# Patient Record
Sex: MALE | Race: WHITE | NOT HISPANIC OR LATINO | Employment: FULL TIME | URBAN - METROPOLITAN AREA
[De-identification: names, ages, dates, MRNs, and addresses within clinical notes are randomized per-mention and may not be internally consistent; named-entity substitution may affect disease eponyms.]

---

## 2017-03-16 DIAGNOSIS — F41.9 ANXIETY: ICD-10-CM

## 2017-03-16 DIAGNOSIS — G47.00 INSOMNIA, UNSPECIFIED TYPE: ICD-10-CM

## 2017-03-17 RX ORDER — ZOLPIDEM TARTRATE 10 MG/1
10 TABLET ORAL NIGHTLY PRN
Qty: 30 TAB | Refills: 2 | OUTPATIENT
Start: 2017-03-17 | End: 2017-06-15 | Stop reason: SDUPTHER

## 2017-03-17 RX ORDER — ALPRAZOLAM 0.5 MG/1
TABLET ORAL
Qty: 30 TAB | Refills: 2 | OUTPATIENT
Start: 2017-03-17 | End: 2017-06-15 | Stop reason: SDUPTHER

## 2017-06-15 DIAGNOSIS — F41.9 ANXIETY: ICD-10-CM

## 2017-06-15 DIAGNOSIS — G47.00 INSOMNIA, UNSPECIFIED TYPE: ICD-10-CM

## 2017-06-15 RX ORDER — ALPRAZOLAM 0.5 MG/1
TABLET ORAL
Qty: 30 TAB | Refills: 2 | Status: SHIPPED
Start: 2017-06-15 | End: 2017-09-08 | Stop reason: SDUPTHER

## 2017-06-15 RX ORDER — ZOLPIDEM TARTRATE 10 MG/1
10 TABLET ORAL NIGHTLY PRN
Qty: 30 TAB | Refills: 2 | Status: SHIPPED
Start: 2017-06-15 | End: 2017-09-08 | Stop reason: SDUPTHER

## 2017-06-28 ENCOUNTER — OFFICE VISIT (OUTPATIENT)
Dept: OTHER | Facility: MEDICAL CENTER | Age: 52
End: 2017-06-28
Payer: COMMERCIAL

## 2017-06-28 VITALS
BODY MASS INDEX: 30.8 KG/M2 | HEIGHT: 71 IN | OXYGEN SATURATION: 96 % | DIASTOLIC BLOOD PRESSURE: 65 MMHG | HEART RATE: 60 BPM | RESPIRATION RATE: 14 BRPM | SYSTOLIC BLOOD PRESSURE: 110 MMHG | TEMPERATURE: 97.4 F | WEIGHT: 220 LBS

## 2017-06-28 DIAGNOSIS — R93.1 AGATSTON CAC SCORE, <100: ICD-10-CM

## 2017-06-28 DIAGNOSIS — E66.9 OBESITY (BMI 30.0-34.9): ICD-10-CM

## 2017-06-28 DIAGNOSIS — B00.2 RECURRENT ORAL HERPES SIMPLEX: ICD-10-CM

## 2017-06-28 DIAGNOSIS — I25.10 CORONARY ATHEROSCLEROSIS DUE TO CALCIFIED CORONARY LESION OF NATIVE ARTERY: Primary | ICD-10-CM

## 2017-06-28 DIAGNOSIS — I25.84 CORONARY ATHEROSCLEROSIS DUE TO CALCIFIED CORONARY LESION OF NATIVE ARTERY: Primary | ICD-10-CM

## 2017-06-28 DIAGNOSIS — E78.2 MIXED HYPERLIPIDEMIA WITH APOLIPOPROTEIN E3 VARIANT: ICD-10-CM

## 2017-06-28 PROCEDURE — 99214 OFFICE O/P EST MOD 30 MIN: CPT | Performed by: FAMILY MEDICINE

## 2017-06-28 RX ORDER — VALACYCLOVIR HYDROCHLORIDE 1 G/1
2000 TABLET, FILM COATED ORAL 2 TIMES DAILY
Qty: 30 TAB | Refills: 12 | Status: SHIPPED | OUTPATIENT
Start: 2017-06-28 | End: 2021-09-25

## 2017-06-28 NOTE — PATIENT INSTRUCTIONS
Current Outpatient Prescriptions Ordered in [facility]   Medication Sig Dispense Refill   • valacyclovir (VALTREX) 1 GM Tab Take 2 Tabs by mouth 2 times a day. 30 Tab 12   • zolpidem (AMBIEN) 10 MG Tab Take 1 Tab by mouth at bedtime as needed for Sleep. 30 Tab 2   • alprazolam (XANAX) 0.5 MG Tab TAKE ONE TABLET BY MOUTH THREE TIMES DAILY AS NEEDED ANXIETY 30 Tab 2   • simvastatin (ZOCOR) 10 MG Tab TAKE 1 TAB BY MOUTH EVERY DAY. 90 Tab 3   • citalopram (CELEXA) 20 MG Tab Take 1 Tab by mouth every day. 90 Tab 3   • therapeutic multivitamin-minerals (THERAGRAN-M) TABS Take 1 Tab by mouth every day.     • aspirin EC (ECOTRIN) 81 MG TBEC Take 1 Tab by mouth every day. 30 Tab    • disulfiram (ANTABUSE) 250 MG Tab TAKE 1 TABLET BY MOUTH ONCE DAILY 90 Tab 0   • ciprofloxacin (CIPRO) 500 MG Tab Take 1 Tab by mouth 2 times a day. 28 Tab 3   • azithromycin (ZITHROMAX) 250 MG Tab Two by mouth on Day One, then one by mouth daily on days 2-5. 12 Tab 3     No current Epic-ordered facility-administered medications on file.

## 2017-06-28 NOTE — PROGRESS NOTES
SUBJECTIVE:    Chief Complaint   Patient presents with   • Coronary Artery Disease     Subclinical   • Hyperlipidemia   • Obesity       Mc Sosa is a 52 y.o. male,   New Patient to my [facility] (but previously established at my [facility].)    PROBLEM #1-HISTORY OF PRESENT ILLNESS  Existing Problem, but requiring re-evaluation  PATIENT STATEMENT OF PROBLEM - Subclinical CAD (per CTCS), Dyslipidemia, Obesity  ONSET - years  COURSE - Subclinical.  He is attempting to improve Therapeutic Lifestyle Changes.  Due for labs and testing. Counseled.   INTENSITY/STATUS/LOCATION/RADIATION - variable/ present  AGGRAVATORS - inadequate Therapeutic Lifestyle Changes    RELIEVERS - statin, some Therapeutic Lifestyle Changes   TREATMENTS/COMPLIANCE/@GOAL? - same/ inadequate/ no     No Known Allergies    Patient Active Problem List    Diagnosis Date Noted   • Agatston CAC score, <100 06/28/2017   • Obesity (BMI 30.0-34.9) 06/28/2017   • Recurrent oral herpes simplex 06/28/2017   • Right ankle pain 09/06/2016   • DJD (degenerative joint disease), ankle and foot 09/06/2016   • Status post ORIF of fracture of ankle 09/06/2016   • Frequent headaches 06/22/2016   • Dysthymia 06/22/2016   • Coronary atherosclerosis due to calcified coronary lesion of native artery 02/12/2016   • Alcohol abuse, episodic 02/14/2014   • Mixed hyperlipidemia with apolipoprotein E3 variant 12/18/2013   • Disturbance in sleep behavior 12/18/2013   • Stress reaction 10/07/2013   • Insomnia 10/07/2013       Outpatient Encounter Prescriptions as of 6/28/2017   Medication Sig Dispense Refill   • valacyclovir (VALTREX) 1 GM Tab Take 2 Tabs by mouth 2 times a day. 30 Tab 12   • zolpidem (AMBIEN) 10 MG Tab Take 1 Tab by mouth at bedtime as needed for Sleep. 30 Tab 2   • alprazolam (XANAX) 0.5 MG Tab TAKE ONE TABLET BY MOUTH THREE TIMES DAILY AS NEEDED ANXIETY 30 Tab 2   • simvastatin (ZOCOR) 10 MG Tab TAKE 1 TAB BY  "MOUTH EVERY DAY. 90 Tab 3   • citalopram (CELEXA) 20 MG Tab Take 1 Tab by mouth every day. 90 Tab 3   • therapeutic multivitamin-minerals (THERAGRAN-M) TABS Take 1 Tab by mouth every day.     • aspirin EC (ECOTRIN) 81 MG TBEC Take 1 Tab by mouth every day. 30 Tab    • disulfiram (ANTABUSE) 250 MG Tab TAKE 1 TABLET BY MOUTH ONCE DAILY 90 Tab 0   • ciprofloxacin (CIPRO) 500 MG Tab Take 1 Tab by mouth 2 times a day. 28 Tab 3   • azithromycin (ZITHROMAX) 250 MG Tab Two by mouth on Day One, then one by mouth daily on days 2-5. 12 Tab 3   • [DISCONTINUED] omeprazole (PRILOSEC) 20 MG delayed-release capsule Take 1 Cap by mouth every day. 60 Cap 6   • [DISCONTINUED] valacyclovir (VALTREX) 1 GM TABS Take 2 Tabs by mouth 2 times a day. 20 Tab 12     No facility-administered encounter medications on file as of 6/28/2017.       Social History   Substance Use Topics   • Smoking status: Never Smoker    • Smokeless tobacco: Never Used   • Alcohol Use: No      Comment: Previous heavy drinking       No family history on file.    Patient's Past, Social, and Family History reviewed and updated by me in EPIC today.    REVIEW OF SYMPTOMS:               Pertinent Positives as above.    All other systems reviewed and negative.     OBJECTIVE:    /65 mmHg  Pulse 60  Temp(Src) 36.3 °C (97.4 °F)  Resp 14  Ht 1.791 m (5' 10.5\")  Wt 99.791 kg (220 lb)  BMI 31.11 kg/m2  SpO2 96%  Body mass index is 31.11 kg/(m^2).    Well developed, well nourished male, no acute distress, non-ill appearing. Comfortable, appears stated age, pleasant and cooperative  HEAD: atraumatic, normocephalic   EYES: Conjunctiva normal, extra-occular movements intact, PERRLA, acuity grossly intact.   EARS/NOSE/THROAT: TM's normal, no signs or symptoms of infection, no perforation, no hemotympanum, acuity grossly intact. Oropharynx: benign, no lesions noted. Nares: benign.   NECK: supple, no adenopathy, no thyromegaly or nodules, no jugular vein distention, no " carotid bruits.   CHEST/LUNGS: clear to auscultation and percussion bilaterally. No adventitious breath sounds.   CARDIOVASCULAR: regular rate and rhythm, no murmur. Point of maximum intensity not displaced. Good central and peripheral pulses.   BACK: no CVA tenderness.   ABDOMEN: soft, non-tender, non-distended, no masses, no hepatosplenomegaly. Normal active bowel tones.   : deferred.   Rectal: deferred.   Extremities: warm/well-perfused, no cyanosis, clubbing, or edema.   SKIN: clear, unbroken, no rashes, normal turgor.   Neuro: Mental Status: Alert and Oriented x 3. CN II-XII grossly intact. Gait normal. Non-focal, intact. Normal strength, sensation    ASSESSMENT:    1. Coronary atherosclerosis due to calcified coronary lesions of native artery     2. Agatston CAC score, <100     3. Mixed hyperlipidemia with apolipoprotein E3 variant     4. Obesity (BMI 30.0-34.9)     5. Recurrent oral herpes simplex  valacyclovir (VALTREX) 1 GM Tab       PLAN:    Total Face-to-Face time spent with patient: 60 minutes  Amount of time spent counseling patient and/or coordinating care: 40 minutes    The nature of patient counseling as below:  -Patient Education, including below topics:  -Differential Diagnoses and treatment options discussed  -Risks, benefits, alternatives discussed  -Health Maintenance Exam issues discussed  -Exercise  -Dietary recommendations discussed  -Weight Loss strategies discussed  -Therapeutic Lifestyle Changes discussed    The nature of coordination of care as below:  -Medications added: none  -Medications discontinued: none  -Medications adjusted: Valtrex refilled  -Continue (other) present chronic medications  -Labs: soon, fasting: Platinum Initial...cmp/cbc/ua/uacr  -Referrals: none  -Other: I recommended he obtain a FLU vaccine in 2017 (he missed this in 2016)   Testing for DAY OF annual Executive H&P   -TVS w/ cIMT   -Dx Chest   -Abdominal U/S   -ETT  -Seek medical attention immediately if  worse    FOLLOW-UP:  -in 1-2 months for annual Executive H&P  -and sooner if test/consult results warrant  And for Health Care Maintenance Exams  And as needed.

## 2017-07-05 ENCOUNTER — APPOINTMENT (OUTPATIENT)
Dept: OTHER | Facility: MEDICAL CENTER | Age: 52
End: 2017-07-05

## 2017-07-05 ENCOUNTER — HOSPITAL ENCOUNTER (OUTPATIENT)
Facility: MEDICAL CENTER | Age: 52
End: 2017-07-05
Attending: FAMILY MEDICINE
Payer: COMMERCIAL

## 2017-07-05 DIAGNOSIS — Z00.00 WELL ADULT EXAM: ICD-10-CM

## 2017-07-05 LAB
ALBUMIN SERPL BCP-MCNC: 4 G/DL (ref 3.2–4.9)
ALBUMIN/GLOB SERPL: 1.3 G/DL
ALP SERPL-CCNC: 51 U/L (ref 30–99)
ALT SERPL-CCNC: 26 U/L (ref 2–50)
ANION GAP SERPL CALC-SCNC: 7 MMOL/L (ref 0–11.9)
APPEARANCE UR: CLEAR
AST SERPL-CCNC: 23 U/L (ref 12–45)
BASOPHILS # BLD AUTO: 0.8 % (ref 0–1.8)
BASOPHILS # BLD: 0.04 K/UL (ref 0–0.12)
BILIRUB SERPL-MCNC: 1 MG/DL (ref 0.1–1.5)
BILIRUB UR QL STRIP.AUTO: NEGATIVE
BUN SERPL-MCNC: 18 MG/DL (ref 8–22)
CALCIUM SERPL-MCNC: 9.4 MG/DL (ref 8.5–10.5)
CHLORIDE SERPL-SCNC: 103 MMOL/L (ref 96–112)
CO2 SERPL-SCNC: 25 MMOL/L (ref 20–33)
COLOR UR: YELLOW
CREAT SERPL-MCNC: 0.94 MG/DL (ref 0.5–1.4)
EOSINOPHIL # BLD AUTO: 0.28 K/UL (ref 0–0.51)
EOSINOPHIL NFR BLD: 5.5 % (ref 0–6.9)
ERYTHROCYTE [DISTWIDTH] IN BLOOD BY AUTOMATED COUNT: 45.8 FL (ref 35.9–50)
GFR SERPL CREATININE-BSD FRML MDRD: >60 ML/MIN/1.73 M 2
GLOBULIN SER CALC-MCNC: 3.2 G/DL (ref 1.9–3.5)
GLUCOSE SERPL-MCNC: 82 MG/DL (ref 65–99)
GLUCOSE UR STRIP.AUTO-MCNC: NEGATIVE MG/DL
HCT VFR BLD AUTO: 48.6 % (ref 42–52)
HGB BLD-MCNC: 16.8 G/DL (ref 14–18)
IMM GRANULOCYTES # BLD AUTO: 0.02 K/UL (ref 0–0.11)
IMM GRANULOCYTES NFR BLD AUTO: 0.4 % (ref 0–0.9)
KETONES UR STRIP.AUTO-MCNC: ABNORMAL MG/DL
LEUKOCYTE ESTERASE UR QL STRIP.AUTO: NEGATIVE
LYMPHOCYTES # BLD AUTO: 1.89 K/UL (ref 1–4.8)
LYMPHOCYTES NFR BLD: 37 % (ref 22–41)
MCH RBC QN AUTO: 32.9 PG (ref 27–33)
MCHC RBC AUTO-ENTMCNC: 34.6 G/DL (ref 33.7–35.3)
MCV RBC AUTO: 95.1 FL (ref 81.4–97.8)
MICRO URNS: ABNORMAL
MONOCYTES # BLD AUTO: 0.5 K/UL (ref 0–0.85)
MONOCYTES NFR BLD AUTO: 9.8 % (ref 0–13.4)
NEUTROPHILS # BLD AUTO: 2.38 K/UL (ref 1.82–7.42)
NEUTROPHILS NFR BLD: 46.5 % (ref 44–72)
NITRITE UR QL STRIP.AUTO: NEGATIVE
NRBC # BLD AUTO: 0 K/UL
NRBC BLD AUTO-RTO: 0 /100 WBC
PH UR STRIP.AUTO: 6.5 [PH]
PLATELET # BLD AUTO: 195 K/UL (ref 164–446)
PMV BLD AUTO: 10.3 FL (ref 9–12.9)
POTASSIUM SERPL-SCNC: 4.3 MMOL/L (ref 3.6–5.5)
PROT SERPL-MCNC: 7.2 G/DL (ref 6–8.2)
PROT UR QL STRIP: NEGATIVE MG/DL
RBC # BLD AUTO: 5.11 M/UL (ref 4.7–6.1)
RBC UR QL AUTO: NEGATIVE
SODIUM SERPL-SCNC: 135 MMOL/L (ref 135–145)
SP GR UR STRIP.AUTO: 1.01
WBC # BLD AUTO: 5.1 K/UL (ref 4.8–10.8)

## 2017-07-06 LAB
CREAT UR-MCNC: 76.2 MG/DL
MICROALBUMIN UR-MCNC: <0.7 MG/DL
MICROALBUMIN/CREAT UR: NORMAL MG/G (ref 0–30)

## 2017-07-07 DIAGNOSIS — Z00.00 WELL ADULT EXAM: ICD-10-CM

## 2017-08-08 ENCOUNTER — HOSPITAL ENCOUNTER (OUTPATIENT)
Dept: RADIOLOGY | Facility: MEDICAL CENTER | Age: 52
End: 2017-08-08
Attending: FAMILY MEDICINE

## 2017-08-08 ENCOUNTER — OFFICE VISIT (OUTPATIENT)
Dept: OTHER | Facility: MEDICAL CENTER | Age: 52
End: 2017-08-08

## 2017-08-08 VITALS
HEART RATE: 55 BPM | OXYGEN SATURATION: 98 % | HEIGHT: 70 IN | DIASTOLIC BLOOD PRESSURE: 84 MMHG | BODY MASS INDEX: 29.35 KG/M2 | SYSTOLIC BLOOD PRESSURE: 116 MMHG | RESPIRATION RATE: 14 BRPM | WEIGHT: 205 LBS | TEMPERATURE: 98.5 F

## 2017-08-08 DIAGNOSIS — I25.10 CORONARY ATHEROSCLEROSIS DUE TO CALCIFIED CORONARY LESION OF NATIVE ARTERY: ICD-10-CM

## 2017-08-08 DIAGNOSIS — Z00.00 WELL ADULT EXAM: ICD-10-CM

## 2017-08-08 DIAGNOSIS — I25.84 CORONARY ATHEROSCLEROSIS DUE TO CALCIFIED CORONARY LESION OF NATIVE ARTERY: ICD-10-CM

## 2017-08-08 DIAGNOSIS — E66.3 OVERWEIGHT (BMI 25.0-29.9): ICD-10-CM

## 2017-08-08 DIAGNOSIS — E78.2 MIXED HYPERLIPIDEMIA WITH APOLIPOPROTEIN E3 VARIANT: ICD-10-CM

## 2017-08-08 DIAGNOSIS — R93.1 AGATSTON CAC SCORE, <100: ICD-10-CM

## 2017-08-08 DIAGNOSIS — I65.23 CAROTID ATHEROSCLEROSIS, BILATERAL: ICD-10-CM

## 2017-08-08 DIAGNOSIS — Z00.00 WELL ADULT EXAM: Primary | ICD-10-CM

## 2017-08-08 PROCEDURE — 306734 HCHG ADVANCED VASCULAR SCREENING

## 2017-08-08 PROCEDURE — 71020 DX-CHEST-2 VIEWS: CPT

## 2017-08-08 PROCEDURE — 76700 US EXAM ABDOM COMPLETE: CPT

## 2017-08-08 RX ORDER — ROSUVASTATIN CALCIUM 10 MG/1
10 TABLET, COATED ORAL EVERY EVENING
Qty: 90 TAB | Refills: 4 | Status: SHIPPED
Start: 2017-08-08 | End: 2017-12-05 | Stop reason: SINTOL

## 2017-08-08 RX ORDER — ROSUVASTATIN CALCIUM 10 MG/1
10 TABLET, COATED ORAL EVERY EVENING
Qty: 100 TAB | Refills: 12 | Status: SHIPPED
Start: 2017-08-08 | End: 2017-08-08 | Stop reason: SDUPTHER

## 2017-08-08 NOTE — LETTER
"August 28, 2017        Mc Ian  [address]  [place] [address]        Dear Mc:    Thank you for participating in [facility].  I enjoyed meeting with you again today and performing your Executive History and Physical examination.  We covered a great deal of information, and I have summarized my Assessment and Plan below.  Please carefully review all the information in this packet.  I do suggest you schedule an appointment with me again in about 4 months.    Overall, I consider you to be in very good health.  I am particularly pleased with your new found commitment to exercise, and striving for a healthy weight.  We did, however, discuss and identify some new and existing health issues that require further attention.  Specific to your future Cardiovascular Disease (CVD) risk, we reviewed the following: overweight status, known subclinical mild coronary artery atherosclerosis, newly discovered mild bilateral carotid atherosclerosis, and dyslipidemia.  The best treatment for these maladies is Therapeutic Lifestyle Changes.  Specifically, I recommend eating \"real food, mostly plants, not too much,\" striving for a BMI of 25, daily exercise, active stress management, 7-8 hours of quality sleep per night, a loving social environment, and an altruistic philosophy.  Two excellent resources are the books \"Blue Zones\" by Juni Martinez and \"Spectrum\" by Dr. Jairon Perez.  In addition, I do recommend initiating the medication Rosuvastatin daily, as we discussed.  Please plan on obtaining a FLU vaccine this Fall.  If you have any questions or concerns, please don't hesitate to call.        Sincerely,        Aris Oseguera M.D.              ASSESSMENT:    Encounter Diagnoses   Name Primary?   • Executive History and Physical Examination Yes   • Overweight (BMI 25.0-29.9)    • Agatston CAC score, <100    • Mixed hyperlipidemia with apolipoprotein E3 variant    • Coronary atherosclerosis due to " "calcified coronary lesion of native artery, per CTCS: 19.6 in 2015    • Carotid atherosclerosis, bilateral        PLAN:    Total Face-to-Face time spent with patient: 75 minutes  Amount of time spent counseling patient and/or coordinating care: 50 minutes    The nature of patient counseling as below:  -Patient Education  -Differential Diagnoses and treatment options discussed  -Risks, benefits, alternatives discussed  -Labs reviewed with patient in detail  -Imaging/ETT/PFT/vision/hearing reports reviewed with patient in detail  -Health Maintenance Exam issues discussed  -Exercise  -Dietary recommendations discussed  -Weight Loss strategies discussed  -Including bibliotherapy in form of \"Spectrum\" by Dr. Jairon Perez   -Including bibliotherapy in the form of \"Blue Zones\" by Juni Anderson  -Therapeutic Lifestyle Changes discussed    The nature of coordination of care as below:  -Medications added: Begin Rosuvastatin 10 mg/day  -Medications discontinued: none  -Medications adjusted: none  -Continue present chronic medications  -Referrals: none  -Other: annual FLU vaccine this Fall  -Seek medical attention immediately if worse    FOLLOW-UP:  -With Primary Care Provider in 4 months     "

## 2017-08-08 NOTE — PROGRESS NOTES
[facility]    EXECUTIVE HISTORY AND PHYSICAL  Performed by Dr. Aris Oseguera    SUBJECTIVE:    Chief Complaint   Patient presents with   • Executive Physical       Mc Sosa is a 52 y.o. male,   Established Patient @ [facility]    Preventive medicine issues discussed:  abuse, aspirin, dental, Alcohol, Tobacco, HIV/AIDS, injuries, mental health/depression, nutrition, exercise, occupational health, sexual behavior, UV exposure, Cancer Screening. Vaccines.      PROBLEM #1-HISTORY OF PRESENT ILLNESS  New Problem  Existing Problem  PATIENT STATEMENT OF PROBLEM - ***  ONSET - ***  COURSE - ***  INTENSITY/STATUS/LOCATION/RADIATION - ***  AGGRAVATORS - ***  RELIEVERS - ***  TREATMENTS/COMPLIANCE/@GOAL? - ***    PROBLEM #2-HISTORY OF PRESENT ILLNESS  New Problem  Existing Problem  PATIENT STATEMENT OF PROBLEM - ***  ONSET - ***  COURSE - ***  INTENSITY/STATUS/LOCATION/RADIATION - ***  AGGRAVATORS - ***  RELIEVERS - ***  TREATMENTS/COMPLIANCE/@GOAL? - ***    PROBLEM #3-HISTORY OF PRESENT ILLNESS  New Problem  Existing Problem  PATIENT STATEMENT OF PROBLEM - ***  ONSET - ***  COURSE - ***  INTENSITY/STATUS/LOCATION/RADIATION - ***  AGGRAVATORS - ***  RELIEVERS - ***  TREATMENTS/COMPLIANCE/@GOAL? - ***    PROBLEM #4-HISTORY OF PRESENT ILLNESS  New Problem  Existing Problem  PATIENT STATEMENT OF PROBLEM - ***  ONSET - ***  COURSE - ***  INTENSITY/STATUS/LOCATION/RADIATION - ***  AGGRAVATORS - ***  RELIEVERS - ***  TREATMENTS/COMPLIANCE/@GOAL? - ***    PROBLEM #5-HISTORY OF PRESENT ILLNESS  New Problem  Existing Problem  PATIENT STATEMENT OF PROBLEM - ***  ONSET - ***  COURSE - ***  INTENSITY/STATUS/LOCATION/RADIATION - ***  AGGRAVATORS - ***  RELIEVERS - ***  TREATMENTS/COMPLIANCE/@GOAL? - ***    PROBLEM #6-HISTORY OF PRESENT ILLNESS  New Problem  Existing Problem  PATIENT STATEMENT OF PROBLEM - ***  ONSET - ***  COURSE - ***  INTENSITY/STATUS/LOCATION/RADIATION - ***  AGGRAVATORS -  ***  RELIEVERS - ***  TREATMENTS/COMPLIANCE/@GOAL? - ***    PROBLEM #7-HISTORY OF PRESENT ILLNESS  New Problem  Existing Problem  PATIENT STATEMENT OF PROBLEM - ***  ONSET - ***  COURSE - ***  INTENSITY/STATUS/LOCATION/RADIATION - ***  AGGRAVATORS - ***  RELIEVERS - ***  TREATMENTS/COMPLIANCE/@GOAL? - ***    No Known Allergies    Patient Active Problem List    Diagnosis Date Noted   • Agatston CAC score, <100 06/28/2017   • Obesity (BMI 30.0-34.9) 06/28/2017   • Recurrent oral herpes simplex 06/28/2017   • Right ankle pain 09/06/2016   • DJD (degenerative joint disease), ankle and foot 09/06/2016   • Status post ORIF of fracture of ankle 09/06/2016   • Frequent headaches 06/22/2016   • Dysthymia 06/22/2016   • Coronary atherosclerosis due to calcified coronary lesion of native artery 02/12/2016   • Alcohol abuse, episodic 02/14/2014   • Mixed hyperlipidemia with apolipoprotein E3 variant 12/18/2013   • Disturbance in sleep behavior 12/18/2013   • Stress reaction 10/07/2013   • Insomnia 10/07/2013       Current Outpatient Prescriptions on File Prior to Visit   Medication Sig Dispense Refill   • valacyclovir (VALTREX) 1 GM Tab Take 2 Tabs by mouth 2 times a day. 30 Tab 12   • zolpidem (AMBIEN) 10 MG Tab Take 1 Tab by mouth at bedtime as needed for Sleep. 30 Tab 2   • alprazolam (XANAX) 0.5 MG Tab TAKE ONE TABLET BY MOUTH THREE TIMES DAILY AS NEEDED ANXIETY 30 Tab 2   • simvastatin (ZOCOR) 10 MG Tab TAKE 1 TAB BY MOUTH EVERY DAY. 90 Tab 3   • disulfiram (ANTABUSE) 250 MG Tab TAKE 1 TABLET BY MOUTH ONCE DAILY 90 Tab 0   • ciprofloxacin (CIPRO) 500 MG Tab Take 1 Tab by mouth 2 times a day. 28 Tab 3   • azithromycin (ZITHROMAX) 250 MG Tab Two by mouth on Day One, then one by mouth daily on days 2-5. 12 Tab 3   • citalopram (CELEXA) 20 MG Tab Take 1 Tab by mouth every day. 90 Tab 3   • therapeutic multivitamin-minerals (THERAGRAN-M) TABS Take 1 Tab by mouth every day.     • aspirin EC (ECOTRIN) 81 MG TBEC Take 1 Tab by  "mouth every day. 30 Tab      No current facility-administered medications on file prior to visit.       Social History     Social History   • Marital Status:      Spouse Name: N/A   • Number of Children: N/A   • Years of Education: N/A     Occupational History   • Not on file.     Social History Main Topics   • Smoking status: Never Smoker    • Smokeless tobacco: Never Used   • Alcohol Use: No      Comment: Previous heavy drinking   • Drug Use: No   • Sexual Activity:     Partners: Female     Other Topics Concern   • Not on file     Social History Narrative       No family history on file.    Patient's Past, Social, and Family History reviewed     REVIEW OF SYMPTOMS:    GEN: Denies weight changes, appetite changes, unusual weakness, bleeding, fever, chills, recent trauma or infections.    HEENT: Denies vision changes, hearing changes, epistaxis, unusual sneezing, sore throat, swallowing difficulties, ear pain, or facial pain.    NECK: Denies neck pain, swellings, or stiffness.    LUNGS: Denies cough, dyspnea, orthopnea, or hemoptysis.    HEART: Denies palpitations or chest pain.    ABD: Denies abdomen pain, eructation, nausea, vomiting, hematemesis, diarrhea, constipation, hematochezia, melena, acholic stools, or flatulence.    GENT: Denies recent dysuria, urine frequency, urine hesitancy, urine urgency, urine flow-slow, urine retention, nocturia, polyuria, dark urine, or incontinence.    BONES/JOINTS/EXTREMITIES: Denies arthralgia, joint stiffness, back pain, muscle cramps, or myalgia.    SKIN: Denies rashes, lesions, anhidrosis, bruising, pruritus.    NEURO: Denies memory loss, disorientation, syncope, diplopia, dizziness, vertigo, clumsiness, paresthesias, or cephalgia.    OBJECTIVE:    /84 mmHg  Pulse 55  Temp(Src) 36.9 °C (98.5 °F)  Resp 14  Ht 1.778 m (5' 10\")  Wt 92.987 kg (205 lb)  BMI 29.41 kg/m2  SpO2 98%  Body mass index is 29.41 kg/(m^2).    Well developed, well nourished male, no " acute distress, non-ill appearing. Comfortable, appears stated age, pleasant and cooperative, Alert and Oriented x 3.   HEAD: atraumatic, normocephalic   EYES: Conjunctiva normal, EOMI, PERRLA, acuity grossly intact.   EARS/NOSE/THROAT: TM's normal, no SSX of infection, no perforation, no hemotympanum, acuity grossly intact. Oropharynx: benign, no lesions noted. Nares: benign.   NECK: supple, no adenopathy, no thyromegaly or nodules, no JVD, no carotid bruits.   CHEST/LUNGS: clear to auscultation and percussion bilaterally. No adventitious breath sounds.   CARDIOVASCULAR: regular rate and rhythm, no murmur. PMI not displaced. Good central and peripheral pulses.   BACK: no CVA tenderness.   ABDOMEN: soft, non-tender, non-distended, no masses, no hepatosplenomegaly. Normal active bowel tones.   : deferred.   Rectal: deferred.   Extremities: warm/well-perfused, no cyanosis, clubbing, or edema.   SKIN: clear, unbroken, no rashes, normal turgor.   Neuro: Mental Status: Alert and Oriented x 3. CN II-XII grossly intact. Gait normal. Non-focal, intact. Normal strength, sensation    ASSESSMENT:    No diagnosis found.    PLAN:    Total Face-to-Face time spent with patient: *** minutes  Amount of time spent counseling patient and/or coordinating care: *** minutes    The nature of patient counseling as below:  -Patient Education  -Differential Diagnoses and treatment options discussed  -Risks, benefits, alternatives discussed  -Labs reviewed with patient in detail  -*** reviewed with patient in detail  -Health Maintenance Exam issues discussed  -Exercise  -Dietary recommendations discussed  -Weight Loss strategies discussed  -QUIT SMOKING!!!  -Therapeutic Lifestyle Changes discussed    The nature of coordination of care as below:  -Medications added: ***  -Medications discontinued: ***  -Medications adjusted: ***  -Continue present chronic medications  -Referrals: ***  -Other: ***  -Seek medical attention immediately if  worse    FOLLOW-UP:  -With Primary Care Provider in ***

## 2017-08-08 NOTE — LETTER
"August 28, 2017        Mc Ian  [address]  [place] [address]        Dear Mc:    Thank you for participating in [facility].  I enjoyed meeting with you again today and performing your Executive History and Physical examination.  We covered a great deal of information, and I have summarized my Assessment and Plan below.  Please carefully review all the information in this packet.  I do suggest you schedule an appointment with me in about 4 months.    Overall, I consider you to be in good health.  We did, however, identify and/or discuss several issues that require further attention.  In terms of future Cardiovascular Disease (CVD) risk, we discussed the following: known mild Coronary Artery atherosclerosis via CT-Cardiac Score in 2015, mild bilateral Carotid Artery atherosclerosis identified today via Ultrasound, overweight status, dyslipidemia.  The best treatment for these issues is Therapeutic Lifestyle Changes. Specifically, I recommend eating \"real food, mostly plants, not too much,\" daily exercise, active stress management, 7-8 hours of quality sleep per night, and an altruistic philosophy.  Two excellent resources are the books \"Blue Zones\" by Juni Martinez, and \"Spectrum\" by Dr. Jairon Perez.  In addition, I do recommend the addition of Rosuvastatin 10 mg/day to help improve your dyslipidemia, and potentially       If you have any questions or concerns, please don't hesitate to call.        Sincerely,        Aris Oseguera M.D.    ASSESSMENT:    Encounter Diagnoses   Name Primary?   • Executive History and Physical Examination Yes   • Overweight (BMI 25.0-29.9)    • Agatston CAC score, <100    • Mixed hyperlipidemia with apolipoprotein E3 variant    • Coronary atherosclerosis due to calcified coronary lesion of native artery, per CTCS: 19.6 in 2015    • Carotid atherosclerosis, bilateral        PLAN:    Total Face-to-Face time spent with patient: 75 minutes  Amount of " "time spent counseling patient and/or coordinating care: 50 minutes    The nature of patient counseling as below:  -Patient Education  -Differential Diagnoses and treatment options discussed  -Risks, benefits, alternatives discussed  -Labs reviewed with patient in detail  -Imaging/ETT/PFT/vision/hearing reports reviewed with patient in detail  -Health Maintenance Exam issues discussed  -Exercise  -Dietary recommendations discussed  -Weight Loss strategies discussed  -Including bibliotherapy in form of \"Spectrum\" by Dr. Jairon Perez   -Including bibliotherapy in the form of \"Blue Zones\" by Juni Anderson  -Therapeutic Lifestyle Changes discussed    The nature of coordination of care as below:  -Medications added: Begin Rosuvastatin 10 mg/day  -Medications discontinued: none  -Medications adjusted: none  -Continue present chronic medications  -Referrals: none  -Other: annual FLU vaccine this Fall  -Seek medical attention immediately if worse    FOLLOW-UP:  -With Primary Care Provider in 4 months       "

## 2017-08-16 DIAGNOSIS — S46.812A PARTIAL TEAR OF LEFT SUBSCAPULARIS TENDON, INITIAL ENCOUNTER: ICD-10-CM

## 2017-08-16 DIAGNOSIS — M75.42 IMPINGEMENT SYNDROME OF LEFT SHOULDER REGION: ICD-10-CM

## 2017-08-16 DIAGNOSIS — M75.22 BICEPS TENDINITIS OF LEFT SHOULDER: Primary | ICD-10-CM

## 2017-08-16 PROBLEM — M75.102 TEAR OF LEFT SUPRASPINATUS TENDON: Status: ACTIVE | Noted: 2017-08-16

## 2017-08-28 PROBLEM — I65.29 CAROTID ATHEROSCLEROSIS: Status: ACTIVE | Noted: 2017-08-28

## 2017-08-28 PROBLEM — E66.9 OBESITY (BMI 30.0-34.9): Status: RESOLVED | Noted: 2017-06-28 | Resolved: 2017-08-28

## 2017-08-28 PROBLEM — Z00.00 WELL ADULT EXAM: Status: ACTIVE | Noted: 2017-08-28

## 2017-08-28 PROBLEM — E66.3 OVERWEIGHT (BMI 25.0-29.9): Status: ACTIVE | Noted: 2017-08-28

## 2017-08-28 NOTE — PROGRESS NOTES
[facility]    EXECUTIVE HISTORY AND PHYSICAL  Performed by Dr. Aris Oseguera    SUBJECTIVE:    Chief Complaint   Patient presents with   • Executive Physical   • Other     Overweight, BMI: 29   • Coronary Artery Disease     Subclinical, per CTCS 19.6 in 2015   • Other     Bilateral Carotid Atherosclerosis per Ultrasound   • Hyperlipidemia       Mc Sosa is a 52 y.o. male,   Established Patient @ [facility]    Preventive medicine issues discussed:  abuse, aspirin, dental, Alcohol, Tobacco, HIV/AIDS, injuries, mental health/depression, nutrition, exercise, occupational health, sexual behavior, UV exposure, Cancer Screening. Vaccines.      PROBLEM #1-HISTORY OF PRESENT ILLNESS  PATIENT STATEMENT OF PROBLEM - Cardiovascular Disease Risk related issues  ONSET - discussed and/or identified today  COURSE - Asymptomatic. No CVD/CeVD event history.  Known mild CAD per CTCS of 19.6 in 2015.  Today's imaging reveals mild bilateral Carotid Atherosclerosis, and a Vascular Age consistent with his Chronological Age.  Current pertinent labs:   HIGH & INT RISK: Tchol/LDL/TRIG/Non-HDL/ApoB/LDL-P/sdLDL/ApoB:ApoA-1/FIB/Lp-PLA2/Adiponectin/Fatty Acid Balance.   Counseled.   INTENSITY/STATUS/LOCATION/RADIATION - variable/ present, asymptomatic/ as above  AGGRAVATORS - Multifactorial including inadequate Therapeutic Lifestyle Changes, genetics  RELIEVERS - some Therapeutic Lifestyle Changes including recent intentional weight loss and exercise  TREATMENTS/COMPLIANCE/@GOAL? - same/ improving Therapeutic Lifestyle Changes/ clinically yes.     Diagnosing METABOLIC SYNDROME  -?-Must have 3 or more of the following 5 Risk Factors(Patient meets criteria # 2)     RISK FACTOR    DEFINING LEVEL  1-Abdominal Obesity        Waist circumference@[email or web address]   Men (North Americans)  >102 cm (>40 inches)   Women (North Americans)  >88 cm (>35 inches)  (see literature for Ethnic Group waist circumference  differences)  2-Triglycerides ?150 mg/dL (or on treatment for this lipid disorder)  3-HDL Cholesterol    Men  <40 mg/dL (or on treatment for this lipid disorder)   Women <50 mg/dL (or on treatment for this lipid disorder)  4-Blood Pressure  ?130 systolic or ?84 diastolic (or on HTN treatment)  5-Fasting Glucose ?100 mg/dL(or previously diagnosed DM or Ins. Resistance)  (FYI: If FBS ?100 mg/dL, then patient also has Insulin Resistance)    Synonyms  Hypertension-hyperglycemia-hyperuricemia syndrome   Syndrome X   Dysmetabolic syndrome X   Insulin resistance syndrome   Metabolic dyslipidemia   The deadly quartet (upper-body obesity, glucose intolerance, hypertriglyceridemia, and hypertension)   Civilization syndrome  Reaven Syndrome    Diagnosing INSULIN RESISTANCE: Any 1 of following (Patient meets criteria: none)  1. Presence of METABOLIC SYNDROME  2. TRIGLYCERIDE/HDL RATIO:  - >3.5 = IR in Caucasians  - ?3.0 = IR in /[place] Americans  - ?2.0 = IR in Non- Blacks  3. Fasting Blood Sugar ? 100 mg/dL         (If FBS > 126, then DM2)  4. Oral Glucose Tolerance Test   - One hour glucose: ? 125 mg/dL   - (If > 150, significantly increased risk of developing DM2)  - Two hour glucose: ? 120 mg/dL  - (120-139=only 33% B-cell fx. 140-199=only 15% B-cell fx)   - (200 or above=DM2 and only 10% B-cell fx.)  - PRE-DIABETES, a type of Insulin Resistance:  o Two hour glucose of 140 to 199  5. A1c ? 6.5%                     (or ? 5.7 % AND the following 2 Dental Parameters:    1- ? 26% of Gum Pockets are ?5mm depth    2- ? 4 Missing Teeth)      No Known Allergies    Patient Active Problem List    Diagnosis Date Noted   • Executive History and Physical Examination 08/28/2017   • Overweight (BMI 25.0-29.9) 08/28/2017   • Carotid atherosclerosis 08/28/2017   • Biceps tendinitis of left shoulder 08/16/2017   • Impingement syndrome of left shoulder region 08/16/2017   • Near complete tear of left supraspinatus tendon  08/16/2017   • Partial tear of left subscapularis tendon 08/16/2017   • Agatston CAC score, <100 06/28/2017   • Recurrent oral herpes simplex 06/28/2017   • Right ankle pain 09/06/2016   • DJD (degenerative joint disease), ankle and foot 09/06/2016   • Status post ORIF of fracture of ankle 09/06/2016   • Frequent headaches 06/22/2016   • Dysthymia 06/22/2016   • Coronary atherosclerosis due to calcified coronary lesion of native artery 02/12/2016   • Alcohol abuse, episodic 02/14/2014   • Mixed hyperlipidemia with apolipoprotein E3 variant 12/18/2013   • Disturbance in sleep behavior 12/18/2013   • Stress reaction 10/07/2013   • Insomnia 10/07/2013       Current Outpatient Prescriptions on File Prior to Visit   Medication Sig Dispense Refill   • valacyclovir (VALTREX) 1 GM Tab Take 2 Tabs by mouth 2 times a day. 30 Tab 12   • zolpidem (AMBIEN) 10 MG Tab Take 1 Tab by mouth at bedtime as needed for Sleep. 30 Tab 2   • alprazolam (XANAX) 0.5 MG Tab TAKE ONE TABLET BY MOUTH THREE TIMES DAILY AS NEEDED ANXIETY 30 Tab 2   • disulfiram (ANTABUSE) 250 MG Tab TAKE 1 TABLET BY MOUTH ONCE DAILY 90 Tab 0   • therapeutic multivitamin-minerals (THERAGRAN-M) TABS Take 1 Tab by mouth every day.     • aspirin EC (ECOTRIN) 81 MG TBEC Take 1 Tab by mouth every day. 30 Tab    • ciprofloxacin (CIPRO) 500 MG Tab Take 1 Tab by mouth 2 times a day. 28 Tab 3   • azithromycin (ZITHROMAX) 250 MG Tab Two by mouth on Day One, then one by mouth daily on days 2-5. 12 Tab 3     No current facility-administered medications on file prior to visit.        Social History     Social History   • Marital status:      Spouse name: N/A   • Number of children: N/A   • Years of education: N/A     Occupational History   • Not on file.     Social History Main Topics   • Smoking status: Never Smoker   • Smokeless tobacco: Never Used   • Alcohol use No      Comment: Previous heavy drinking   • Drug use: No   • Sexual activity: Yes     Partners: Female  "    Other Topics Concern   • Not on file     Social History Narrative   • No narrative on file       No family history on file.    Patient's Past, Social, and Family History reviewed     REVIEW OF SYMPTOMS:               Pertinent Positives as above.    All other systems reviewed and negative.    OBJECTIVE:    /84   Pulse (!) 55   Temp 36.9 °C (98.5 °F)   Resp 14   Ht 1.778 m (5' 10\")   Wt 93 kg (205 lb)   SpO2 98%   BMI 29.41 kg/m²   Body mass index is 29.41 kg/m².    Well developed, well nourished male, no acute distress, non-ill appearing. Comfortable, appears stated age, pleasant and cooperative, Alert and Oriented x 3.   HEAD: atraumatic, normocephalic   EYES: Conjunctiva normal, EOMI, PERRLA, acuity grossly intact.   EARS/NOSE/THROAT: TM's normal, no SSX of infection, no perforation, no hemotympanum, acuity grossly intact. Oropharynx: benign, no lesions noted. Nares: benign.   NECK: supple, no adenopathy, no thyromegaly or nodules, no JVD, no carotid bruits.   CHEST/LUNGS: clear to auscultation and percussion bilaterally. No adventitious breath sounds.   CARDIOVASCULAR: regular rate and rhythm, no murmur. PMI not displaced. Good central and peripheral pulses.   BACK: no CVA tenderness.   ABDOMEN: soft, non-tender, non-distended, no masses, no hepatosplenomegaly. Normal active bowel tones.   : deferred.   Rectal: deferred.   Extremities: warm/well-perfused, no cyanosis, clubbing, or edema.   SKIN: clear, unbroken, no rashes, normal turgor.   Neuro: Mental Status: Alert and Oriented x 3. CN II-XII grossly intact. Gait normal. Non-focal, intact. Normal strength, sensation    EXERCISE STRESS TEST REPORT:    Interpreted by me    INTERPRETATION:  Patient achieved 90% of maximum predicted heart rate with physiological response in blood pressure and no associated ST segment depression.   Also, specifically no associated ST elevation, no significant ventricular extrasystoles, no ventricular tachycardia, " "no new atrial fibrillation or supraventricular tachycardia, and  no new heart blocks.  Patient denied chest pain, severe dyspnea, dizziness, or ataxia.     CONCLUSION:  Normal Exercise Stress Test indicating low probability of flow-limiting coronary artery disease.     ASSESSMENT:    Encounter Diagnoses   Name Primary?   • Executive History and Physical Examination Yes   • Overweight (BMI 25.0-29.9)    • Agatston CAC score, <100    • Mixed hyperlipidemia with apolipoprotein E3 variant    • Coronary atherosclerosis due to calcified coronary lesion of native artery, per CTCS: 19.6 in 2015    • Carotid atherosclerosis, bilateral        PLAN:    Total Face-to-Face time spent with patient: 75 minutes  Amount of time spent counseling patient and/or coordinating care: 50 minutes    The nature of patient counseling as below:  -Patient Education  -Differential Diagnoses and treatment options discussed  -Risks, benefits, alternatives discussed  -Labs reviewed with patient in detail  -Imaging/ETT/PFT/vision/hearing reports reviewed with patient in detail  -Health Maintenance Exam issues discussed  -Exercise  -Dietary recommendations discussed  -Weight Loss strategies discussed  -Including bibliotherapy in form of \"Spectrum\" by Dr. Jairon Perez   -Including bibliotherapy in the form of \"Blue Zones\" by Juni Anderson  -Therapeutic Lifestyle Changes discussed    The nature of coordination of care as below:  -Medications added: Begin Rosuvastatin 10 mg/day  -Medications discontinued: none  -Medications adjusted: none  -Continue present chronic medications  -Referrals: none  -Other: annual FLU vaccine this Fall  -Seek medical attention immediately if worse    FOLLOW-UP:  -With me in 4 months     "

## 2017-09-05 DIAGNOSIS — K52.9 GASTROENTERITIS: ICD-10-CM

## 2017-09-05 DIAGNOSIS — Z71.84 TRAVEL ADVICE ENCOUNTER: ICD-10-CM

## 2017-09-05 DIAGNOSIS — J40 BRONCHITIS: ICD-10-CM

## 2017-09-05 RX ORDER — CIPROFLOXACIN 500 MG/1
500 TABLET, FILM COATED ORAL 2 TIMES DAILY
Qty: 28 TAB | Refills: 3 | Status: SHIPPED | OUTPATIENT
Start: 2017-09-05 | End: 2019-07-24 | Stop reason: SDUPTHER

## 2017-09-05 RX ORDER — AZITHROMYCIN 250 MG/1
TABLET, FILM COATED ORAL
Qty: 12 TAB | Refills: 3 | Status: SHIPPED | OUTPATIENT
Start: 2017-09-05 | End: 2019-07-24 | Stop reason: SDUPTHER

## 2017-09-08 DIAGNOSIS — G47.00 INSOMNIA, UNSPECIFIED TYPE: Primary | ICD-10-CM

## 2017-09-08 DIAGNOSIS — F41.9 ANXIETY: ICD-10-CM

## 2017-09-08 RX ORDER — ALPRAZOLAM 0.5 MG/1
TABLET ORAL
Qty: 30 TAB | Refills: 0 | Status: SHIPPED
Start: 2017-09-08 | End: 2017-10-11 | Stop reason: SDUPTHER

## 2017-09-08 RX ORDER — ZOLPIDEM TARTRATE 10 MG/1
10 TABLET ORAL NIGHTLY PRN
Qty: 30 TAB | Refills: 0 | Status: SHIPPED
Start: 2017-09-08 | End: 2017-10-11 | Stop reason: SDUPTHER

## 2017-09-27 ENCOUNTER — HOSPITAL ENCOUNTER (OUTPATIENT)
Dept: RADIOLOGY | Facility: MEDICAL CENTER | Age: 52
End: 2017-09-27
Attending: PHYSICIAN ASSISTANT
Payer: COMMERCIAL

## 2017-09-27 DIAGNOSIS — M25.512 LEFT SHOULDER PAIN, UNSPECIFIED CHRONICITY: ICD-10-CM

## 2017-09-27 PROCEDURE — 73221 MRI JOINT UPR EXTREM W/O DYE: CPT | Mod: LT

## 2017-10-11 DIAGNOSIS — G47.00 INSOMNIA, UNSPECIFIED TYPE: ICD-10-CM

## 2017-10-11 DIAGNOSIS — F41.9 ANXIETY: ICD-10-CM

## 2017-10-11 RX ORDER — ZOLPIDEM TARTRATE 10 MG/1
10 TABLET ORAL NIGHTLY PRN
Qty: 30 TAB | Refills: 1 | Status: SHIPPED
Start: 2017-10-11 | End: 2017-12-05 | Stop reason: SDUPTHER

## 2017-10-11 RX ORDER — ALPRAZOLAM 0.5 MG/1
TABLET ORAL
Qty: 30 TAB | Refills: 1 | Status: SHIPPED
Start: 2017-10-11 | End: 2017-12-05 | Stop reason: SDUPTHER

## 2017-12-05 ENCOUNTER — OFFICE VISIT (OUTPATIENT)
Dept: OTHER | Facility: MEDICAL CENTER | Age: 52
End: 2017-12-05
Payer: COMMERCIAL

## 2017-12-05 VITALS
DIASTOLIC BLOOD PRESSURE: 88 MMHG | BODY MASS INDEX: 29.23 KG/M2 | WEIGHT: 204.2 LBS | OXYGEN SATURATION: 94 % | RESPIRATION RATE: 14 BRPM | SYSTOLIC BLOOD PRESSURE: 134 MMHG | TEMPERATURE: 98.4 F | HEART RATE: 66 BPM | HEIGHT: 70 IN

## 2017-12-05 DIAGNOSIS — Z78.9 STATIN INTOLERANCE: Primary | ICD-10-CM

## 2017-12-05 DIAGNOSIS — G47.00 INSOMNIA, UNSPECIFIED TYPE: ICD-10-CM

## 2017-12-05 DIAGNOSIS — F41.9 ANXIETY: ICD-10-CM

## 2017-12-05 DIAGNOSIS — F10.10 ALCOHOL ABUSE, EPISODIC: ICD-10-CM

## 2017-12-05 DIAGNOSIS — F43.0 STRESS REACTION: ICD-10-CM

## 2017-12-05 PROCEDURE — 99214 OFFICE O/P EST MOD 30 MIN: CPT | Performed by: FAMILY MEDICINE

## 2017-12-05 RX ORDER — CITALOPRAM 20 MG/1
20 TABLET ORAL DAILY
Qty: 90 TAB | Refills: 3
Start: 2017-12-05 | End: 2019-05-29

## 2017-12-05 RX ORDER — ZOLPIDEM TARTRATE 10 MG/1
10 TABLET ORAL NIGHTLY PRN
Qty: 30 TAB | Refills: 1 | Status: SHIPPED
Start: 2017-12-05 | End: 2018-02-06 | Stop reason: SDUPTHER

## 2017-12-05 RX ORDER — ALPRAZOLAM 0.5 MG/1
TABLET ORAL
Qty: 30 TAB | Refills: 1 | Status: SHIPPED
Start: 2017-12-05 | End: 2017-12-27 | Stop reason: SDUPTHER

## 2017-12-05 NOTE — PROGRESS NOTES
SUBJECTIVE:    Chief Complaint   Patient presents with   • Medication Reaction   • Travel Consult       Mc Sosa is a 52 y.o. male,   Established Patient     PROBLEM #1-HISTORY OF PRESENT ILLNESS  Existing Problem, but requiring re-evaluation  PATIENT STATEMENT OF PROBLEM - Intolerance of Rosuvastatin  ONSET - month  COURSE - I recently prescribed Rosuvastatin 10 mg, but patient was unable to tolerate (malaise primarily). Counseled.   INTENSITY/STATUS/LOCATION/RADIATION - as above  AGGRAVATORS - na  RELIEVERS - d/c'ing med  TREATMENTS/COMPLIANCE/@GOAL? - same/ good/ no     PROBLEM #2-HISTORY OF PRESENT ILLNESS  New Problem  PATIENT STATEMENT OF PROBLEM - He needs med refills for upcoming 1 month trip to [place]    PROBLEM #3-HISTORY OF PRESENT ILLNESS  Existing Problem, but requiring re-evaluation  PATIENT STATEMENT OF PROBLEM - Alcohol related issues  ONSET - discussed today.    No Known Allergies    Patient Active Problem List    Diagnosis Date Noted   • Executive History and Physical Examination 08/28/2017   • Overweight (BMI 25.0-29.9) 08/28/2017   • Carotid atherosclerosis 08/28/2017   • Biceps tendinitis of left shoulder 08/16/2017   • Impingement syndrome of left shoulder region 08/16/2017   • Near complete tear of left supraspinatus tendon 08/16/2017   • Partial tear of left subscapularis tendon 08/16/2017   • Agatston CAC score, <100 06/28/2017   • Recurrent oral herpes simplex 06/28/2017   • Right ankle pain 09/06/2016   • DJD (degenerative joint disease), ankle and foot 09/06/2016   • Status post ORIF of fracture of ankle 09/06/2016   • Frequent headaches 06/22/2016   • Dysthymia 06/22/2016   • Coronary atherosclerosis due to calcified coronary lesion of native artery 02/12/2016   • Alcohol abuse, episodic 02/14/2014   • Mixed hyperlipidemia with apolipoprotein E3 variant 12/18/2013   • Disturbance in sleep behavior 12/18/2013   • Stress reaction 10/07/2013   • Insomnia 10/07/2013  "      Outpatient Encounter Prescriptions as of 12/5/2017   Medication Sig Dispense Refill   • zolpidem (AMBIEN) 10 MG Tab Take 1 Tab by mouth at bedtime as needed for Sleep. 30 Tab 1   • alprazolam (XANAX) 0.5 MG Tab TAKE ONE TABLET BY MOUTH THREE TIMES DAILY AS NEEDED ANXIETY 30 Tab 1   • citalopram (CELEXA) 20 MG Tab Take 1 Tab by mouth every day. 90 Tab 3   • [DISCONTINUED] zolpidem (AMBIEN) 10 MG Tab Take 1 Tab by mouth at bedtime as needed for Sleep. 30 Tab 1   • [DISCONTINUED] alprazolam (XANAX) 0.5 MG Tab TAKE ONE TABLET BY MOUTH THREE TIMES DAILY AS NEEDED ANXIETY 30 Tab 1   • ciprofloxacin (CIPRO) 500 MG Tab Take 1 Tab by mouth 2 times a day. 28 Tab 3   • azithromycin (ZITHROMAX) 250 MG Tab Two by mouth on Day One, then one by mouth daily on days 2-5. 12 Tab 3   • [DISCONTINUED] rosuvastatin (CRESTOR) 10 MG Tab Take 1 Tab by mouth every evening. 90 Tab 4   • valacyclovir (VALTREX) 1 GM Tab Take 2 Tabs by mouth 2 times a day. 30 Tab 12   • disulfiram (ANTABUSE) 250 MG Tab TAKE 1 TABLET BY MOUTH ONCE DAILY 90 Tab 0   • therapeutic multivitamin-minerals (THERAGRAN-M) TABS Take 1 Tab by mouth every day.     • aspirin EC (ECOTRIN) 81 MG TBEC Take 1 Tab by mouth every day. 30 Tab      No facility-administered encounter medications on file as of 12/5/2017.        Social History   Substance Use Topics   • Smoking status: Never Smoker   • Smokeless tobacco: Never Used   • Alcohol use Yes      Comment: Previous heavy drinking       No family history on file.    Patient's Past, Social, and Family History reviewed and updated by me in EPIC today.    REVIEW OF SYMPTOMS:               Pertinent Positives as above.    All other systems reviewed and negative.     OBJECTIVE:    /88   Pulse 66   Temp 36.9 °C (98.4 °F)   Resp 14   Ht 1.778 m (5' 10\")   Wt 92.6 kg (204 lb 3.2 oz)   SpO2 94%   BMI 29.30 kg/m²   Body mass index is 29.3 kg/m².    Well developed, well nourished male, no acute distress, non-ill " appearing. Comfortable, appears stated age, pleasant and cooperative  HEAD: atraumatic, normocephalic   EYES: Conjunctiva normal, extra-occular movements intact, PERRLA, acuity grossly intact.   EARS/NOSE/THROAT: TM's normal, no signs or symptoms of infection, no perforation, no hemotympanum, acuity grossly intact. Oropharynx: benign, no lesions noted. Nares: benign.   NECK: supple, no adenopathy, no thyromegaly or nodules, no jugular vein distention, no carotid bruits.   CHEST/LUNGS: clear to auscultation and percussion bilaterally. No adventitious breath sounds.   CARDIOVASCULAR: regular rate and rhythm, no murmur. Point of maximum intensity not displaced. Good central and peripheral pulses.   BACK: no CVA tenderness.   ABDOMEN: soft, non-tender, non-distended, no masses, no hepatosplenomegaly. Normal active bowel tones.   : deferred.   Rectal: deferred.   Extremities: warm/well-perfused, no cyanosis, clubbing, or edema.   SKIN: clear, unbroken, no rashes, normal turgor.   Neuro: Mental Status: Alert and Oriented x 3. CN II-XII grossly intact. Gait normal. Non-focal, intact. Normal strength, sensation    ASSESSMENT:    1. Rosuvastatin intolerance     2. Insomnia, unspecified type  zolpidem (AMBIEN) 10 MG Tab   3. Anxiety  alprazolam (XANAX) 0.5 MG Tab   4. Stress reaction  citalopram (CELEXA) 20 MG Tab   5. Alcohol abuse, episodic         PLAN:    Total Face-to-Face time spent with patient: 30 minutes  Amount of time spent counseling patient and/or coordinating care: 20 minutes    The nature of patient counseling as below:  -Patient Education, including below topics:  -Differential Diagnoses and treatment options discussed  -Risks, benefits, alternatives discussed  -Labs reviewed with patient in detail  -Imaging reviewed with patient in detail  -Health Maintenance Exam issues discussed  -Exercise  -Dietary recommendations discussed  -Discontinue alcohol altogether and employ outside help with this  -Therapeutic  Lifestyle Changes discussed    The nature of coordination of care as below:  -Medications added: refills  -Medications discontinued: none  -Medications adjusted: Med list updated  -Continue (other) present chronic medications  -Blood Pressure Diary  -Other: Annual FLU vaccine ASAP  -Seek medical attention immediately if worse    FOLLOW-UP:  -in 2 months  -and sooner if test/consult results warrant  And for Health Care Maintenance Exams  And as needed.

## 2017-12-05 NOTE — PATIENT INSTRUCTIONS
Current Outpatient Prescriptions Ordered in [facility]   Medication Sig Dispense Refill   • zolpidem (AMBIEN) 10 MG Tab Take 1 Tab by mouth at bedtime as needed for Sleep. 30 Tab 1   • alprazolam (XANAX) 0.5 MG Tab TAKE ONE TABLET BY MOUTH THREE TIMES DAILY AS NEEDED ANXIETY 30 Tab 1   • citalopram (CELEXA) 20 MG Tab Take 1 Tab by mouth every day. 90 Tab 3   • ciprofloxacin (CIPRO) 500 MG Tab Take 1 Tab by mouth 2 times a day. 28 Tab 3   • azithromycin (ZITHROMAX) 250 MG Tab Two by mouth on Day One, then one by mouth daily on days 2-5. 12 Tab 3   • valacyclovir (VALTREX) 1 GM Tab Take 2 Tabs by mouth 2 times a day. 30 Tab 12   • disulfiram (ANTABUSE) 250 MG Tab TAKE 1 TABLET BY MOUTH ONCE DAILY 90 Tab 0   • therapeutic multivitamin-minerals (THERAGRAN-M) TABS Take 1 Tab by mouth every day.     • aspirin EC (ECOTRIN) 81 MG TBEC Take 1 Tab by mouth every day. 30 Tab      No current Epic-ordered facility-administered medications on file.

## 2017-12-08 DIAGNOSIS — F41.9 ANXIETY: ICD-10-CM

## 2017-12-08 RX ORDER — ALPRAZOLAM 0.5 MG/1
TABLET ORAL
Qty: 30 TAB | Refills: 1 | Status: CANCELLED
Start: 2017-12-08

## 2017-12-27 DIAGNOSIS — F41.9 ANXIETY: ICD-10-CM

## 2017-12-27 RX ORDER — ALPRAZOLAM 0.5 MG/1
TABLET ORAL
Qty: 30 TAB | Refills: 1 | Status: SHIPPED
Start: 2017-12-27 | End: 2018-02-28 | Stop reason: SDUPTHER

## 2018-02-05 ENCOUNTER — OFFICE VISIT (OUTPATIENT)
Dept: OTHER | Facility: MEDICAL CENTER | Age: 53
End: 2018-02-05
Payer: COMMERCIAL

## 2018-02-05 VITALS
HEART RATE: 52 BPM | TEMPERATURE: 98.5 F | RESPIRATION RATE: 16 BRPM | HEIGHT: 70 IN | BODY MASS INDEX: 28.98 KG/M2 | DIASTOLIC BLOOD PRESSURE: 80 MMHG | WEIGHT: 202.4 LBS | SYSTOLIC BLOOD PRESSURE: 120 MMHG | OXYGEN SATURATION: 96 %

## 2018-02-05 DIAGNOSIS — F10.10 ALCOHOL ABUSE, EPISODIC: ICD-10-CM

## 2018-02-05 DIAGNOSIS — G47.00 INSOMNIA, UNSPECIFIED TYPE: ICD-10-CM

## 2018-02-05 DIAGNOSIS — F34.1 DYSTHYMIA: ICD-10-CM

## 2018-02-05 DIAGNOSIS — F43.0 STRESS REACTION: Primary | ICD-10-CM

## 2018-02-05 PROCEDURE — 99214 OFFICE O/P EST MOD 30 MIN: CPT | Performed by: FAMILY MEDICINE

## 2018-02-05 NOTE — PATIENT INSTRUCTIONS
Current Outpatient Prescriptions Ordered in [facility]   Medication Sig Dispense Refill   • alprazolam (XANAX) 0.5 MG Tab TAKE ONE TABLET BY MOUTH THREE TIMES DAILY AS NEEDED ANXIETY 30 Tab 1   • zolpidem (AMBIEN) 10 MG Tab Take 1 Tab by mouth at bedtime as needed for Sleep. 30 Tab 1   • citalopram (CELEXA) 20 MG Tab Take 1 Tab by mouth every day. 90 Tab 3   • ciprofloxacin (CIPRO) 500 MG Tab Take 1 Tab by mouth 2 times a day. 28 Tab 3   • azithromycin (ZITHROMAX) 250 MG Tab Two by mouth on Day One, then one by mouth daily on days 2-5. 12 Tab 3   • valacyclovir (VALTREX) 1 GM Tab Take 2 Tabs by mouth 2 times a day. 30 Tab 12   • disulfiram (ANTABUSE) 250 MG Tab TAKE 1 TABLET BY MOUTH ONCE DAILY 90 Tab 0   • therapeutic multivitamin-minerals (THERAGRAN-M) TABS Take 1 Tab by mouth every day.     • aspirin EC (ECOTRIN) 81 MG TBEC Take 1 Tab by mouth every day. 30 Tab      No current Epic-ordered facility-administered medications on file.

## 2018-02-05 NOTE — PROGRESS NOTES
SUBJECTIVE:    Chief Complaint   Patient presents with   • GI Problem   • Stress       Mc Sosa is a 52 y.o. male,   Established Patient     PROBLEM #1-HISTORY OF PRESENT ILLNESS  New Problem  PATIENT STATEMENT OF PROBLEM - Dyspepsia, chest paresthesias, anxiety.  Still drinking Alcohol.   ONSET - weeks  COURSE - significant stress.  None of his symptoms worsen with exercise, in fact, quite the opposite.  He re-started Citalopram 20 mg/day just 3 days ago.  He also restarted Disulfiram a few days ago.  He has been having poor willpower, poor sleep, poor mood, not suicidal. Counseled.   INTENSITY/STATUS/LOCATION/RADIATION - moderate+/ as above  AGGRAVATORS - Multifactorial   RELIEVERS - ?  TREATMENTS/COMPLIANCE/@GOAL? - as above/ no/ no     No Known Allergies    Patient Active Problem List    Diagnosis Date Noted   • Executive History and Physical Examination 08/28/2017   • Overweight (BMI 25.0-29.9) 08/28/2017   • Carotid atherosclerosis 08/28/2017   • Biceps tendinitis of left shoulder 08/16/2017   • Impingement syndrome of left shoulder region 08/16/2017   • Near complete tear of left supraspinatus tendon 08/16/2017   • Partial tear of left subscapularis tendon 08/16/2017   • Agatston CAC score, <100 06/28/2017   • Recurrent oral herpes simplex 06/28/2017   • Right ankle pain 09/06/2016   • DJD (degenerative joint disease), ankle and foot 09/06/2016   • Status post ORIF of fracture of ankle 09/06/2016   • Frequent headaches 06/22/2016   • Dysthymia 06/22/2016   • Coronary atherosclerosis due to calcified coronary lesion of native artery 02/12/2016   • Alcohol abuse, episodic 02/14/2014   • Mixed hyperlipidemia with apolipoprotein E3 variant 12/18/2013   • Disturbance in sleep behavior 12/18/2013   • Stress reaction 10/07/2013   • Insomnia 10/07/2013       Outpatient Encounter Prescriptions as of 2/5/2018   Medication Sig Dispense Refill   • alprazolam (XANAX) 0.5 MG Tab TAKE ONE TABLET BY MOUTH THREE  "TIMES DAILY AS NEEDED ANXIETY 30 Tab 1   • zolpidem (AMBIEN) 10 MG Tab Take 1 Tab by mouth at bedtime as needed for Sleep. 30 Tab 1   • citalopram (CELEXA) 20 MG Tab Take 1 Tab by mouth every day. 90 Tab 3   • ciprofloxacin (CIPRO) 500 MG Tab Take 1 Tab by mouth 2 times a day. 28 Tab 3   • azithromycin (ZITHROMAX) 250 MG Tab Two by mouth on Day One, then one by mouth daily on days 2-5. 12 Tab 3   • valacyclovir (VALTREX) 1 GM Tab Take 2 Tabs by mouth 2 times a day. 30 Tab 12   • disulfiram (ANTABUSE) 250 MG Tab TAKE 1 TABLET BY MOUTH ONCE DAILY 90 Tab 0   • therapeutic multivitamin-minerals (THERAGRAN-M) TABS Take 1 Tab by mouth every day.     • aspirin EC (ECOTRIN) 81 MG TBEC Take 1 Tab by mouth every day. 30 Tab      No facility-administered encounter medications on file as of 2/5/2018.        Social History   Substance Use Topics   • Smoking status: Never Smoker   • Smokeless tobacco: Never Used   • Alcohol use Yes      Comment: Previous heavy drinking       No family history on file.    Patient's Past, Social, and Family History reviewed and updated by me in EPIC today.    REVIEW OF SYMPTOMS:               Pertinent Positives as above.    All other systems reviewed and negative.     OBJECTIVE:    /80   Pulse (!) 52   Temp 36.9 °C (98.5 °F)   Resp 16   Ht 1.778 m (5' 10\")   Wt 91.8 kg (202 lb 6.4 oz)   SpO2 96%   BMI 29.04 kg/m²   Body mass index is 29.04 kg/m².    Well developed, well nourished male, no acute distress, non-ill appearing. Comfortable, appears stated age, pleasant and cooperative  HEAD: atraumatic, normocephalic   EYES: Conjunctiva normal, extra-occular movements intact, PERRLA, acuity grossly intact.   EARS/NOSE/THROAT: TM's normal, no signs or symptoms of infection, no perforation, no hemotympanum, acuity grossly intact. Oropharynx: benign, no lesions noted. Nares: benign.   NECK: supple, no adenopathy, no thyromegaly or nodules, no jugular vein distention, no carotid bruits. "   CHEST/LUNGS: clear to auscultation and percussion bilaterally. No adventitious breath sounds.   CARDIOVASCULAR: regular rate and rhythm, no murmur. Point of maximum intensity not displaced. Good central and peripheral pulses.   BACK: no CVA tenderness.   ABDOMEN: soft, non-tender, non-distended, no masses, no hepatosplenomegaly. Normal active bowel tones.   : deferred.   Rectal: deferred.   Extremities: warm/well-perfused, no cyanosis, clubbing, or edema.   SKIN: clear, unbroken, no rashes, normal turgor.   Neuro: Mental Status: Alert and Oriented x 3. CN II-XII grossly intact. Gait normal. Non-focal, intact. Normal strength, sensation    ASSESSMENT:    1. Stress reaction     2. Dysthymia     3. Insomnia, unspecified type     4. Alcohol abuse, episodic         PLAN:    Total Face-to-Face time spent with patient: 30 minutes  Amount of time spent counseling patient and/or coordinating care: 20 minutes    The nature of patient counseling as below:  -Patient Education, including below topics:  -Differential Diagnoses and treatment options discussed  -Risks, benefits, alternatives discussed  -Exercise  -Dietary recommendations discussed  -Avoid all alcohol  -Therapeutic Lifestyle Changes discussed    The nature of coordination of care as below:  -Medications added: he recently resumed Citalopram and Disulfiram  -Medications discontinued: none  -Medications adjusted: none  -Continue (other) present chronic medications  -Referrals: Resume OP Psychotherapy ASAP  -Other: none  -Seek medical attention immediately if worse    FOLLOW-UP:  -in 1 month  -and sooner if test/consult results warrant  And for Health Care Maintenance Exams  And as needed.

## 2018-02-06 DIAGNOSIS — G47.00 INSOMNIA, UNSPECIFIED TYPE: ICD-10-CM

## 2018-02-07 RX ORDER — ZOLPIDEM TARTRATE 10 MG/1
10 TABLET ORAL NIGHTLY PRN
Qty: 30 TAB | Refills: 1 | Status: SHIPPED
Start: 2018-02-07 | End: 2018-03-09

## 2018-02-27 ENCOUNTER — APPOINTMENT (OUTPATIENT)
Dept: OTHER | Facility: MEDICAL CENTER | Age: 53
End: 2018-02-27
Payer: COMMERCIAL

## 2018-02-28 DIAGNOSIS — F41.9 ANXIETY: ICD-10-CM

## 2018-02-28 RX ORDER — ALPRAZOLAM 0.5 MG/1
TABLET ORAL
Qty: 30 TAB | Refills: 1 | Status: SHIPPED
Start: 2018-02-28 | End: 2018-03-15 | Stop reason: SDUPTHER

## 2018-03-15 DIAGNOSIS — F41.9 ANXIETY: ICD-10-CM

## 2018-03-16 RX ORDER — ALPRAZOLAM 0.5 MG/1
TABLET ORAL
Qty: 30 TAB | Refills: 1 | Status: SHIPPED
Start: 2018-03-16 | End: 2018-04-27 | Stop reason: SDUPTHER

## 2018-04-04 DIAGNOSIS — G47.00 INSOMNIA, UNSPECIFIED TYPE: ICD-10-CM

## 2018-04-09 RX ORDER — ZOLPIDEM TARTRATE 10 MG/1
10 TABLET ORAL NIGHTLY PRN
Qty: 30 TAB | Refills: 1 | OUTPATIENT
Start: 2018-04-09 | End: 2018-05-09

## 2018-04-27 DIAGNOSIS — F41.9 ANXIETY: ICD-10-CM

## 2018-04-27 RX ORDER — ALPRAZOLAM 0.5 MG/1
TABLET ORAL
Qty: 30 TAB | Refills: 1 | OUTPATIENT
Start: 2018-04-27 | End: 2018-06-12 | Stop reason: SDUPTHER

## 2018-06-12 DIAGNOSIS — F41.9 ANXIETY: ICD-10-CM

## 2018-06-12 DIAGNOSIS — Z72.820 SLEEP DEFICIENT: ICD-10-CM

## 2018-06-14 RX ORDER — ALPRAZOLAM 0.5 MG/1
TABLET ORAL
Qty: 30 TAB | Refills: 1 | OUTPATIENT
Start: 2018-06-14 | End: 2018-08-02 | Stop reason: SDUPTHER

## 2018-06-14 RX ORDER — ZOLPIDEM TARTRATE 10 MG/1
10 TABLET ORAL NIGHTLY PRN
Qty: 30 TAB | Refills: 1 | OUTPATIENT
Start: 2018-06-14 | End: 2018-07-14

## 2018-08-02 DIAGNOSIS — F41.9 ANXIETY: ICD-10-CM

## 2018-08-02 DIAGNOSIS — Z72.820 SLEEP DEFICIENT: ICD-10-CM

## 2018-08-03 RX ORDER — ZOLPIDEM TARTRATE 10 MG/1
10 TABLET ORAL NIGHTLY PRN
Qty: 30 TAB | Refills: 1 | OUTPATIENT
Start: 2018-08-03 | End: 2018-09-02

## 2018-08-03 RX ORDER — ALPRAZOLAM 0.5 MG/1
TABLET ORAL
Qty: 30 TAB | Refills: 1 | OUTPATIENT
Start: 2018-08-03 | End: 2018-09-05 | Stop reason: SDUPTHER

## 2018-09-04 ENCOUNTER — TELEPHONE (OUTPATIENT)
Dept: OTHER | Facility: MEDICAL CENTER | Age: 53
End: 2018-09-04

## 2018-09-05 DIAGNOSIS — F41.9 ANXIETY: ICD-10-CM

## 2018-09-05 RX ORDER — ALPRAZOLAM 0.5 MG/1
TABLET ORAL
Qty: 30 TAB | Refills: 1 | Status: SHIPPED
Start: 2018-09-05 | End: 2019-08-06

## 2018-10-07 ENCOUNTER — HOSPITAL ENCOUNTER (EMERGENCY)
Facility: MEDICAL CENTER | Age: 53
End: 2018-10-07
Attending: EMERGENCY MEDICINE
Payer: COMMERCIAL

## 2018-10-07 VITALS
HEIGHT: 70 IN | WEIGHT: 208.11 LBS | BODY MASS INDEX: 29.79 KG/M2 | HEART RATE: 94 BPM | RESPIRATION RATE: 17 BRPM | TEMPERATURE: 97.8 F | SYSTOLIC BLOOD PRESSURE: 137 MMHG | OXYGEN SATURATION: 94 % | DIASTOLIC BLOOD PRESSURE: 106 MMHG

## 2018-10-07 DIAGNOSIS — F10.920 ALCOHOLIC INTOXICATION WITHOUT COMPLICATION (HCC): ICD-10-CM

## 2018-10-07 LAB
ALBUMIN SERPL BCP-MCNC: 4 G/DL (ref 3.2–4.9)
ALBUMIN/GLOB SERPL: 1.3 G/DL
ALP SERPL-CCNC: 64 U/L (ref 30–99)
ALT SERPL-CCNC: 44 U/L (ref 2–50)
ANION GAP SERPL CALC-SCNC: 11 MMOL/L (ref 0–11.9)
APAP SERPL-MCNC: <10 UG/ML (ref 10–30)
AST SERPL-CCNC: 54 U/L (ref 12–45)
BILIRUB SERPL-MCNC: 0.7 MG/DL (ref 0.1–1.5)
BUN SERPL-MCNC: 10 MG/DL (ref 8–22)
CALCIUM SERPL-MCNC: 8.3 MG/DL (ref 8.4–10.2)
CHLORIDE SERPL-SCNC: 102 MMOL/L (ref 96–112)
CO2 SERPL-SCNC: 21 MMOL/L (ref 20–33)
CREAT SERPL-MCNC: 0.77 MG/DL (ref 0.5–1.4)
ETHANOL BLD-MCNC: 0.41 G/DL
GLOBULIN SER CALC-MCNC: 3.1 G/DL (ref 1.9–3.5)
GLUCOSE SERPL-MCNC: 132 MG/DL (ref 65–99)
POTASSIUM SERPL-SCNC: 3.6 MMOL/L (ref 3.6–5.5)
PROT SERPL-MCNC: 7.1 G/DL (ref 6–8.2)
SALICYLATES SERPL-MCNC: <4 MG/DL (ref 15–25)
SODIUM SERPL-SCNC: 134 MMOL/L (ref 135–145)

## 2018-10-07 PROCEDURE — 80053 COMPREHEN METABOLIC PANEL: CPT

## 2018-10-07 PROCEDURE — 99284 EMERGENCY DEPT VISIT MOD MDM: CPT

## 2018-10-07 PROCEDURE — 700105 HCHG RX REV CODE 258: Performed by: EMERGENCY MEDICINE

## 2018-10-07 PROCEDURE — 36415 COLL VENOUS BLD VENIPUNCTURE: CPT

## 2018-10-07 PROCEDURE — 80307 DRUG TEST PRSMV CHEM ANLYZR: CPT

## 2018-10-07 RX ORDER — SODIUM CHLORIDE 9 MG/ML
INJECTION, SOLUTION INTRAVENOUS CONTINUOUS
Status: DISCONTINUED | OUTPATIENT
Start: 2018-10-07 | End: 2018-10-07 | Stop reason: HOSPADM

## 2018-10-07 RX ADMIN — SODIUM CHLORIDE 1000 ML: 9 INJECTION, SOLUTION INTRAVENOUS at 17:31

## 2018-10-07 ASSESSMENT — PAIN SCALES - GENERAL: PAINLEVEL_OUTOF10: 0

## 2018-10-08 NOTE — ED PROVIDER NOTES
ED Provider Note    CHIEF COMPLAINT  Chief Complaint   Patient presents with   • Hypertension       HPI  Mc Sosa is a 53 y.o. male who presents to the emergency department initially with a complaint of hypertension but in actuality the patient arrives intoxicated.  When I initially attempted to interview the patient he simply stared at me and would not answer any questions he apparently told the triage nurse that he had been on a 2-day binge drinking large amounts of vodka.  Later after the patient became clinically sober he confirmed that over the last couple of days he has been drinking very heavily he is concerned that maybe his blood pressure is elevated but does not take any antihypertensive medications.  He denies any illicit drug abuse and states that he has no wish to harm himself or others.    REVIEW OF SYSTEMS no trauma no chest pain no abdominal pain no difficulty breathing no vomiting or diarrhea.  All other systems negative    PAST MEDICAL HISTORY  Past Medical History:   Diagnosis Date   • ASTHMA    • Hypertension        FAMILY HISTORY  History reviewed. No pertinent family history.    SOCIAL HISTORY  Social History     Social History   • Marital status:      Spouse name: N/A   • Number of children: N/A   • Years of education: N/A     Social History Main Topics   • Smoking status: Never Smoker   • Smokeless tobacco: Never Used   • Alcohol use Yes      Comment: Hx of alcoholism   • Drug use: No   • Sexual activity: Yes     Partners: Female     Other Topics Concern   • Not on file     Social History Narrative   • No narrative on file       SURGICAL HISTORY  Past Surgical History:   Procedure Laterality Date   • OTHER ORTHOPEDIC SURGERY      r ankle       CURRENT MEDICATIONS  Home Medications    **Home medications have not yet been reviewed for this encounter**         ALLERGIES  No Known Allergies    PHYSICAL EXAM  VITAL SIGNS: /106   Pulse 94   Temp 36.6 °C (97.8 °F)    "Resp 17   Ht 1.778 m (5' 10\")   Wt 94.4 kg (208 lb 1.8 oz)   SpO2 94%   BMI 29.86 kg/m²    Oxygen saturation is interpreted as adequate  Constitutional: Initially as mentioned above the patient would not answer any questions but later he is awake and verbal and lucid  HENT: No sign of acute trauma to the head mucous membranes are moist  Eyes: No erythema or discharge or jaundice  Neck: Trachea midline no JVD  Cardiovascular: Regular rate and rhythm  Lungs: Clear and equal bilaterally  Abdomen/Back: Soft nontender nondistended no rebound guarding or peritoneal findings  Skin: Warm and dry  Musculoskeletal: No acute bony deformity  Neurologic: Initially the patient did appear intoxicated but after a couple of hours of sobering up he is now awake lucid able to carry on normal conversation he is ambulatory without difficulty and despite his elevated alcohol level he is clinically sober    Laboratory  Aspirin and Tylenol levels are undetectable basic metabolic panel is unremarkable liver functions are unremarkable blood alcohol level at the time of arrival is elevated at 0.41    MEDICAL DECISION MAKING and DISPOSITION  In the emergency department an IV was established and because of apparent intoxication he was given intravenous fluids and reevaluation shows that his condition has improved greatly.  At this point in time the patient is up he is ambulatory with good balance and mobility he is able to carry on a normal lucid conversation and is mentioned above clinically he appears intoxicated.  He is going to call in uber to take him home.  Recheck of his blood pressure shows slightly elevated values but nothing that needs emergency treatment at this time.  I have advised him that he needs to see his primary care doctor and seek treatment for alcoholism and he will need recheck of his blood pressure in the office.  If the patient feels that he needs emergency medical care or he feels that he would like to hurt himself " or others he agrees to return for recheck    IMPRESSION  1.  Alcoholism  2.  Episode of acute alcohol intoxication         Electronically signed by: Brendon Dozier, 10/7/2018 8:53 PM

## 2018-10-08 NOTE — ED NOTES
Returned to room to hang ivf, found pulse ox off, iv out, arm band on floor with pt resting onside with eyes closed. Iv restarted w/o difficulty, pt reoriented to plan of care and surroundings

## 2018-10-08 NOTE — ED NOTES
Pt arguing with staff as wife will not come get him due to sleeping children. erp to bedside, pt ok to d/c though not drive. Pt aware of same

## 2018-10-08 NOTE — ED NOTES
assisted pt to call for uber. Dc instructions provided. Aware of no driving due to probable intoxication and , if attempted staff will call [employer].. License plate and vehicle make provided to security to ensure pt and vehicle  safety

## 2018-10-08 NOTE — ED NOTES
Saline lock with blood draw. Placed on cardiac moniter. Pt minimally verbal, denies pain, oriented to person only

## 2018-10-08 NOTE — DISCHARGE INSTRUCTIONS
Avoid alcohol, follow-up with your primary care doctor as soon as possible to recheck your blood pressure.  Return here if you wish to harm yourself or others or you need emergency medical care.

## 2018-10-08 NOTE — ED NOTES
erp  To bedside, states pt ok for d/c upon presentation of sober . rn to room, pt pulled out iv and in process of taking off pulse ox and moniter leads. Reminded that sober  needed to present self to er prior to d/c. States will call for same

## 2018-10-08 NOTE — ED NOTES
Pt awake and conversing appropriately. Pt states that he would like to go home, states that his wife can come and pick him up. ERP notified.

## 2018-10-08 NOTE — ED NOTES
"Pt rounding-states \"ready to go home\" . Assisted to stand for void . Unable at this time. States came to hospital for \"high blood pressure\" but unable to articulate further. Arm bands noted on floor, iv intact, vs as charted   "

## 2018-10-10 RX ORDER — ZOLPIDEM TARTRATE 10 MG/1
TABLET ORAL
Refills: 1 | OUTPATIENT
Start: 2018-10-10

## 2018-11-05 ENCOUNTER — OFFICE VISIT (OUTPATIENT)
Dept: OTHER | Facility: MEDICAL CENTER | Age: 53
End: 2018-11-05
Payer: COMMERCIAL

## 2018-11-05 DIAGNOSIS — G47.9 SLEEP DISORDER: ICD-10-CM

## 2018-11-05 DIAGNOSIS — S86.912A KNEE STRAIN, LEFT, INITIAL ENCOUNTER: Primary | ICD-10-CM

## 2018-11-05 DIAGNOSIS — M23.92 KNEE INTERNAL DERANGEMENT, LEFT: ICD-10-CM

## 2018-11-05 DIAGNOSIS — Z23 FLU VACCINE NEED: ICD-10-CM

## 2018-11-05 PROCEDURE — 90686 IIV4 VACC NO PRSV 0.5 ML IM: CPT | Performed by: FAMILY MEDICINE

## 2018-11-05 PROCEDURE — 99214 OFFICE O/P EST MOD 30 MIN: CPT | Mod: 25 | Performed by: FAMILY MEDICINE

## 2018-11-05 PROCEDURE — 90471 IMMUNIZATION ADMIN: CPT | Performed by: FAMILY MEDICINE

## 2018-11-05 RX ORDER — TRAZODONE HYDROCHLORIDE 50 MG/1
50 TABLET ORAL NIGHTLY PRN
Qty: 30 TAB | Refills: 3
Start: 2018-11-05 | End: 2018-11-05 | Stop reason: SDUPTHER

## 2018-11-05 RX ORDER — TRAZODONE HYDROCHLORIDE 50 MG/1
50 TABLET ORAL NIGHTLY PRN
Qty: 90 TAB | Refills: 1 | Status: SHIPPED | OUTPATIENT
Start: 2018-11-05 | End: 2019-01-28 | Stop reason: SDUPTHER

## 2018-11-13 VITALS
SYSTOLIC BLOOD PRESSURE: 110 MMHG | DIASTOLIC BLOOD PRESSURE: 72 MMHG | OXYGEN SATURATION: 98 % | HEART RATE: 57 BPM | RESPIRATION RATE: 14 BRPM | TEMPERATURE: 98.3 F

## 2018-11-13 ASSESSMENT — PAIN SCALES - GENERAL: PAINLEVEL: 4=SLIGHT-MODERATE PAIN

## 2018-11-14 NOTE — PATIENT INSTRUCTIONS
Current Outpatient Prescriptions Ordered in [facility]   Medication Sig Dispense Refill   • traZODone (DESYREL) 50 MG Tab Take 1 Tab by mouth at bedtime as needed for Sleep. 90 Tab 1   • citalopram (CELEXA) 20 MG Tab Take 1 Tab by mouth every day. 90 Tab 3   • ciprofloxacin (CIPRO) 500 MG Tab Take 1 Tab by mouth 2 times a day. 28 Tab 3   • azithromycin (ZITHROMAX) 250 MG Tab Two by mouth on Day One, then one by mouth daily on days 2-5. 12 Tab 3   • valacyclovir (VALTREX) 1 GM Tab Take 2 Tabs by mouth 2 times a day. 30 Tab 12   • disulfiram (ANTABUSE) 250 MG Tab TAKE 1 TABLET BY MOUTH ONCE DAILY 90 Tab 0   • therapeutic multivitamin-minerals (THERAGRAN-M) TABS Take 1 Tab by mouth every day.     • aspirin EC (ECOTRIN) 81 MG TBEC Take 1 Tab by mouth every day. 30 Tab    • ALPRAZolam (XANAX) 0.5 MG Tab TAKE ONE TABLET BY MOUTH THREE TIMES DAILY AS NEEDED ANXIETY 30 Tab 1     No current Epic-ordered facility-administered medications on file.

## 2018-11-14 NOTE — PROGRESS NOTES
SUBJECTIVE:    Chief Complaint   Patient presents with   • Knee Pain   • Other     Vaccines       Mc Sosa is a 53 y.o. male,   Established Patient     PROBLEM #1-HISTORY OF PRESENT ILLNESS  New Problem  PATIENT STATEMENT OF PROBLEM - worsening left knee pain  ONSET - month+  COURSE - no specific recent injury, but pain is worsening. He has seen a Physical Therapist who recommended a custom knee brace. I recommend an MRI of Left Knee.   INTENSITY/STATUS/LOCATION/RADIATION - severe/ worsening/ left knee/ no  AGGRAVATORS - overuse, certain movements  RELIEVERS - rest  TREATMENTS/COMPLIANCE/@GOAL? - PT/ good/ no     PROBLEM #2-HISTORY OF PRESENT ILLNESS  New Problem  PATIENT STATEMENT OF PROBLEM - Due for FLU vaccine  ONSET - discussed today    PROBLEM #3-HISTORY OF PRESENT ILLNESS  Existing Problem, but requiring re-evaluation  PATIENT STATEMENT OF PROBLEM - Sleep and alcohol issues  ONSET - months  COURSE - he remains alcohol free for past month or so. He is adhering to the program. He is using trazodone successfully for sleep, and needs refill. Counseled.   INTENSITY/STATUS/LOCATION/RADIATION - was severe/ currently improved  AGGRAVATORS - Multifactorial   RELIEVERS - as above  TREATMENTS/COMPLIANCE/@GOAL? - same/ currently compliant/ not yet at goal.     No Known Allergies    Patient Active Problem List    Diagnosis Date Noted   • Executive History and Physical Examination 08/28/2017   • Overweight (BMI 25.0-29.9) 08/28/2017   • Carotid atherosclerosis 08/28/2017   • Biceps tendinitis of left shoulder 08/16/2017   • Impingement syndrome of left shoulder region 08/16/2017   • Near complete tear of left supraspinatus tendon 08/16/2017   • Partial tear of left subscapularis tendon 08/16/2017   • Agatston CAC score, <100 06/28/2017   • Recurrent oral herpes simplex 06/28/2017   • Right ankle pain 09/06/2016   • DJD (degenerative joint disease), ankle and foot 09/06/2016   • Status post ORIF of fracture of  ankle 09/06/2016   • Frequent headaches 06/22/2016   • Dysthymia 06/22/2016   • Coronary atherosclerosis due to calcified coronary lesion of native artery 02/12/2016   • Alcohol abuse, episodic 02/14/2014   • Mixed hyperlipidemia with apolipoprotein E3 variant 12/18/2013   • Disturbance in sleep behavior 12/18/2013   • Stress reaction 10/07/2013   • Insomnia 10/07/2013       Outpatient Encounter Prescriptions as of 11/5/2018   Medication Sig Dispense Refill   • traZODone (DESYREL) 50 MG Tab Take 1 Tab by mouth at bedtime as needed for Sleep. 90 Tab 1   • citalopram (CELEXA) 20 MG Tab Take 1 Tab by mouth every day. 90 Tab 3   • ciprofloxacin (CIPRO) 500 MG Tab Take 1 Tab by mouth 2 times a day. 28 Tab 3   • azithromycin (ZITHROMAX) 250 MG Tab Two by mouth on Day One, then one by mouth daily on days 2-5. 12 Tab 3   • valacyclovir (VALTREX) 1 GM Tab Take 2 Tabs by mouth 2 times a day. 30 Tab 12   • disulfiram (ANTABUSE) 250 MG Tab TAKE 1 TABLET BY MOUTH ONCE DAILY 90 Tab 0   • therapeutic multivitamin-minerals (THERAGRAN-M) TABS Take 1 Tab by mouth every day.     • aspirin EC (ECOTRIN) 81 MG TBEC Take 1 Tab by mouth every day. 30 Tab    • [DISCONTINUED] traZODone (DESYREL) 50 MG Tab Take 1 Tab by mouth at bedtime as needed for Sleep. 30 Tab 3   • ALPRAZolam (XANAX) 0.5 MG Tab TAKE ONE TABLET BY MOUTH THREE TIMES DAILY AS NEEDED ANXIETY 30 Tab 1     No facility-administered encounter medications on file as of 11/5/2018.        Social History   Substance Use Topics   • Smoking status: Never Smoker   • Smokeless tobacco: Never Used   • Alcohol use No      Comment: No alcohol currently. Hx of alcoholism       No family history on file.    Patient's Past, Social, and Family History reviewed and updated by me in EPIC today.    REVIEW OF SYMPTOMS:               Pertinent Positives as above.    All other systems reviewed and negative.     OBJECTIVE:    /72 (BP Location: Left arm, Patient Position: Sitting, BP Cuff Size:  Adult)   Pulse (!) 57   Temp 36.8 °C (98.3 °F) (Temporal)   Resp 14   SpO2 98%   There is no height or weight on file to calculate BMI.    Well developed, well nourished male, no acute distress, non-ill appearing. Comfortable, appears stated age, pleasant and cooperative  HEAD: atraumatic, normocephalic   EYES: Conjunctiva normal, extra-occular movements intact, PERRLA, acuity grossly intact.   EARS/NOSE/THROAT: TM's normal, no signs or symptoms of infection, no perforation, no hemotympanum, acuity grossly intact. Oropharynx: benign, no lesions noted. Nares: benign.   NECK: supple, no adenopathy, no thyromegaly or nodules, no jugular vein distention, no carotid bruits.   CHEST/LUNGS: clear to auscultation and percussion bilaterally. No adventitious breath sounds.   CARDIOVASCULAR: regular rate and rhythm, no murmur. Point of maximum intensity not displaced. Good central and peripheral pulses.   BACK: no CVA tenderness.   ABDOMEN: soft, non-tender, non-distended, no masses, no hepatosplenomegaly. Normal active bowel tones.   : deferred.   Rectal: deferred.   Extremities: warm/well-perfused, no cyanosis, clubbing, or edema. L knee: some knee hypermobility, and pain with PROM, minimal knee swelling. Distal circulation & sensation intact.   SKIN: clear, unbroken, no rashes, normal turgor.   Neuro: Mental Status: Alert and Oriented x 3. CN II-XII grossly intact. Gait normal. Non-focal, intact. Normal strength, sensation    ASSESSMENT:    1. Knee strain, left, initial encounter  MR-KNEE-W/O LEFT   2. Knee internal derangement, left  MR-KNEE-W/O LEFT   3. Flu vaccine need     4. Sleep disorder  traZODone (DESYREL) 50 MG Tab    DISCONTINUED: traZODone (DESYREL) 50 MG Tab       PLAN:    Total Face-to-Face time spent with patient: 30 minutes  Amount of time spent counseling patient and/or coordinating care: 20 minutes    The nature of patient counseling as below:  -Patient Education, including below  topics:  -Differential Diagnoses and treatment options discussed  -Risks, benefits, alternatives discussed  -Exercise  -Dietary recommendations discussed  -Remain ETOH free  -Therapeutic Lifestyle Changes discussed    The nature of coordination of care as below:  -Medications added/refilled: Trazodone  -Medications discontinued: none  -Medications adjusted: none  -Continue (other) present chronic medications  -PHYSICAL THERAPY to continue  -Other: L Knee MRI   Flu Vaccine given in clinic today  -Seek medical attention immediately if worse    FOLLOW-UP:  -in 1 months  -and sooner if test/consult results warrant  And for Health Care Maintenance Exams  And as needed.

## 2018-11-15 ENCOUNTER — HOSPITAL ENCOUNTER (OUTPATIENT)
Dept: RADIOLOGY | Facility: MEDICAL CENTER | Age: 53
End: 2018-11-15
Attending: FAMILY MEDICINE
Payer: COMMERCIAL

## 2018-11-15 DIAGNOSIS — S86.912A KNEE STRAIN, LEFT, INITIAL ENCOUNTER: ICD-10-CM

## 2018-11-15 DIAGNOSIS — M23.92 KNEE INTERNAL DERANGEMENT, LEFT: ICD-10-CM

## 2018-11-15 PROCEDURE — 73721 MRI JNT OF LWR EXTRE W/O DYE: CPT | Mod: LT

## 2018-11-26 DIAGNOSIS — M23.92 INTERNAL DERANGEMENT OF MULTIPLE SITES OF LEFT KNEE: ICD-10-CM

## 2018-12-04 ENCOUNTER — APPOINTMENT (OUTPATIENT)
Dept: OTHER | Facility: MEDICAL CENTER | Age: 53
End: 2018-12-04
Payer: COMMERCIAL

## 2018-12-04 DIAGNOSIS — L98.9 SCALP LESION: ICD-10-CM

## 2018-12-04 DIAGNOSIS — F10.21 ALCOHOL DEPENDENCE IN REMISSION (HCC): ICD-10-CM

## 2018-12-04 RX ORDER — DISULFIRAM 250 MG/1
250 TABLET ORAL DAILY
Qty: 180 TAB | Refills: 1 | Status: SHIPPED | OUTPATIENT
Start: 2018-12-04 | End: 2019-08-16 | Stop reason: SDUPTHER

## 2019-01-28 DIAGNOSIS — G47.9 SLEEP DISORDER: ICD-10-CM

## 2019-01-28 RX ORDER — TRAZODONE HYDROCHLORIDE 50 MG/1
50 TABLET ORAL NIGHTLY PRN
Qty: 180 TAB | Refills: 1 | Status: SHIPPED | OUTPATIENT
Start: 2019-01-28 | End: 2019-06-18 | Stop reason: SDUPTHER

## 2019-05-06 DIAGNOSIS — F10.10 ALCOHOL ABUSE: ICD-10-CM

## 2019-05-06 DIAGNOSIS — F34.1 DYSTHYMIA: ICD-10-CM

## 2019-05-06 RX ORDER — NALTREXONE HYDROCHLORIDE 50 MG/1
50 TABLET, FILM COATED ORAL DAILY
Qty: 90 TAB | Refills: 0 | Status: SHIPPED | OUTPATIENT
Start: 2019-05-06 | End: 2019-09-27 | Stop reason: SDUPTHER

## 2019-05-06 RX ORDER — FLUOXETINE HYDROCHLORIDE 20 MG/1
20 CAPSULE ORAL DAILY
Qty: 90 CAP | Refills: 0 | Status: SHIPPED
Start: 2019-05-06 | End: 2019-08-03 | Stop reason: SDUPTHER

## 2019-05-29 ENCOUNTER — OFFICE VISIT (OUTPATIENT)
Dept: OTHER | Facility: MEDICAL CENTER | Age: 54
End: 2019-05-29
Payer: COMMERCIAL

## 2019-05-29 VITALS
BODY MASS INDEX: 31.41 KG/M2 | RESPIRATION RATE: 12 BRPM | SYSTOLIC BLOOD PRESSURE: 120 MMHG | HEIGHT: 70 IN | TEMPERATURE: 99 F | WEIGHT: 219.4 LBS | OXYGEN SATURATION: 97 % | HEART RATE: 57 BPM | DIASTOLIC BLOOD PRESSURE: 88 MMHG

## 2019-05-29 DIAGNOSIS — N52.9 ERECTILE DYSFUNCTION, UNSPECIFIED ERECTILE DYSFUNCTION TYPE: Primary | ICD-10-CM

## 2019-05-29 DIAGNOSIS — F10.10 ALCOHOL ABUSE, EPISODIC: ICD-10-CM

## 2019-05-29 DIAGNOSIS — E78.2 MIXED HYPERLIPIDEMIA WITH APOLIPOPROTEIN E3 VARIANT: ICD-10-CM

## 2019-05-29 PROCEDURE — 99214 OFFICE O/P EST MOD 30 MIN: CPT | Performed by: FAMILY MEDICINE

## 2019-05-31 NOTE — PROGRESS NOTES
"SUBJECTIVE:    Chief Complaint   Patient presents with   • Obesity   • Alcohol Problem   • Erectile Dysfunction       Mc Sosa is a 54 y.o. male,   Established Patient     PROBLEM #1-HISTORY OF PRESENT ILLNESS  Existing Problem, but requiring re-evaluation  PATIENT STATEMENT OF PROBLEM - Obesity/weight gain  ONSET - months  COURSE - counseled. He is not adhering to Therapeutic Lifestyle Changes the way he knows he should. He had a relapse with his sobriety but he states he has been sober again for \"51 days\" and he is enrolled in an outpatient alcohol rehabilitation program. He is now taking: floxetine, naltrexone, trazodone, disulfiram. He notes that he has some intermittent erectile dysfunction now that he is not drinking, and taking these medications. He is working on his relationship with his wife.   INTENSITY/STATUS/LOCATION/RADIATION - variable/ as above  AGGRAVATORS - Multifactorial including addictive personality  RELIEVERS - as above  TREATMENTS/COMPLIANCE/@GOAL? - as above/ currently yes/ not 100%    No Known Allergies    Patient Active Problem List    Diagnosis Date Noted   • Executive History and Physical Examination 08/28/2017   • Overweight (BMI 25.0-29.9) 08/28/2017   • Carotid atherosclerosis 08/28/2017   • Biceps tendinitis of left shoulder 08/16/2017   • Impingement syndrome of left shoulder region 08/16/2017   • Near complete tear of left supraspinatus tendon 08/16/2017   • Partial tear of left subscapularis tendon 08/16/2017   • Agatston CAC score, <100 06/28/2017   • Recurrent oral herpes simplex 06/28/2017   • Right ankle pain 09/06/2016   • DJD (degenerative joint disease), ankle and foot 09/06/2016   • Status post ORIF of fracture of ankle 09/06/2016   • Frequent headaches 06/22/2016   • Dysthymia 06/22/2016   • Coronary atherosclerosis due to calcified coronary lesion of native artery 02/12/2016   • Alcohol abuse, episodic 02/14/2014   • Mixed hyperlipidemia with apolipoprotein " "E3 variant 12/18/2013   • Disturbance in sleep behavior 12/18/2013   • Stress reaction 10/07/2013   • Insomnia 10/07/2013       Outpatient Encounter Prescriptions as of 5/29/2019   Medication Sig Dispense Refill   • FLUoxetine (PROZAC) 20 MG Cap Take 1 Cap by mouth every day. 90 Cap 0   • naltrexone (DEPADE) 50 MG Tab Take 1 Tab by mouth every day. 90 Tab 0   • traZODone (DESYREL) 50 MG Tab Take 1 Tab by mouth at bedtime as needed for Sleep. 180 Tab 1   • disulfiram (ANTABUSE) 250 MG Tab Take 1 Tab by mouth every day. 180 Tab 1   • ALPRAZolam (XANAX) 0.5 MG Tab TAKE ONE TABLET BY MOUTH THREE TIMES DAILY AS NEEDED ANXIETY 30 Tab 1   • [DISCONTINUED] citalopram (CELEXA) 20 MG Tab Take 1 Tab by mouth every day. 90 Tab 3   • ciprofloxacin (CIPRO) 500 MG Tab Take 1 Tab by mouth 2 times a day. 28 Tab 3   • azithromycin (ZITHROMAX) 250 MG Tab Two by mouth on Day One, then one by mouth daily on days 2-5. 12 Tab 3   • valacyclovir (VALTREX) 1 GM Tab Take 2 Tabs by mouth 2 times a day. 30 Tab 12   • therapeutic multivitamin-minerals (THERAGRAN-M) TABS Take 1 Tab by mouth every day.     • aspirin EC (ECOTRIN) 81 MG TBEC Take 1 Tab by mouth every day. 30 Tab      No facility-administered encounter medications on file as of 5/29/2019.        Social History   Substance Use Topics   • Smoking status: Never Smoker   • Smokeless tobacco: Never Used   • Alcohol use No      Comment: No alcohol currently. Hx of alcoholism       No family history on file.    Patient's Past, Social, and Family History reviewed and updated by me in EPIC today.    REVIEW OF SYMPTOMS:               Pertinent Positives as above.    All other systems reviewed and negative.     OBJECTIVE:    /88   Pulse (!) 57   Temp 37.2 °C (99 °F) (Temporal)   Resp 12   Ht 1.778 m (5' 10\")   Wt 99.5 kg (219 lb 6.4 oz)   SpO2 97%   BMI 31.48 kg/m²   Body mass index is 31.48 kg/m².    Well developed, well nourished overweight male, no acute distress, non-ill " appearing. Comfortable, appears stated age, pleasant and cooperative  HEAD: atraumatic, normocephalic   EYES: Conjunctiva normal, extra-occular movements intact, PERRLA, acuity grossly intact.   EARS/NOSE/THROAT: TM's normal, no signs or symptoms of infection, no perforation, no hemotympanum, acuity grossly intact. Oropharynx: benign, no lesions noted. Nares: benign.   NECK: supple, no adenopathy, no thyromegaly or nodules, no jugular vein distention, no carotid bruits.   CHEST/LUNGS: clear to auscultation and percussion bilaterally. No adventitious breath sounds.   CARDIOVASCULAR: regular rate and rhythm, no murmur. Point of maximum intensity not displaced. Good central and peripheral pulses.   BACK: no CVA tenderness.   ABDOMEN: soft, non-tender, non-distended, no masses, no hepatosplenomegaly. Normal active bowel tones.   : deferred.   Rectal: deferred.   Extremities: warm/well-perfused, no cyanosis, clubbing, or edema.   SKIN: clear, unbroken, no rashes, normal turgor.   Neuro: Mental Status: Alert and Oriented x 3. CN II-XII grossly intact. Gait normal. Non-focal, intact. Normal strength, sensation    ASSESSMENT:    1. Erectile dysfunction, unspecified erectile dysfunction type  Comp Metabolic Panel    CBC WITH DIFFERENTIAL    TSH WITH REFLEX TO FT4    TESTOSTERONE F&T MALE ADULT   2. Alcohol abuse, episodic  Comp Metabolic Panel    CBC WITH DIFFERENTIAL   3. BMI 31.0-31.9,adult     4. Mixed hyperlipidemia with apolipoprotein E3 variant  Comp Metabolic Panel    LipoFit by NMR    APOLIPOPROTEIN B    MICROALBUMIN CREAT RATIO URINE       PLAN:    Total Face-to-Face time spent with patient: 30 minutes  Amount of time spent counseling patient and/or coordinating care: 20 minutes    The nature of patient counseling as below:  -Patient Education, including below topics:  -Differential Diagnoses and treatment options discussed  -Risks, benefits, alternatives discussed  -Exercise  -Dietary recommendations  discussed  -Weight Loss strategies discussed  -Remain alcohol free  -Therapeutic Lifestyle Changes discussed    The nature of coordination of care as below:  -Continue (other) present chronic medications  -Labs: cbc/cmp/tsh/testosterone/nmr/apoB  -Referrals: none  -Other: none  -Seek medical attention immediately if worse    FOLLOW-UP:  -in 2 months  -and sooner if test/consult results warrant  And for Health Care Maintenance Exams  And as needed.

## 2019-05-31 NOTE — PATIENT INSTRUCTIONS
Current Outpatient Prescriptions Ordered in [facility]   Medication Sig Dispense Refill   • FLUoxetine (PROZAC) 20 MG Cap Take 1 Cap by mouth every day. 90 Cap 0   • naltrexone (DEPADE) 50 MG Tab Take 1 Tab by mouth every day. 90 Tab 0   • traZODone (DESYREL) 50 MG Tab Take 1 Tab by mouth at bedtime as needed for Sleep. 180 Tab 1   • disulfiram (ANTABUSE) 250 MG Tab Take 1 Tab by mouth every day. 180 Tab 1   • ALPRAZolam (XANAX) 0.5 MG Tab TAKE ONE TABLET BY MOUTH THREE TIMES DAILY AS NEEDED ANXIETY 30 Tab 1   • ciprofloxacin (CIPRO) 500 MG Tab Take 1 Tab by mouth 2 times a day. 28 Tab 3   • azithromycin (ZITHROMAX) 250 MG Tab Two by mouth on Day One, then one by mouth daily on days 2-5. 12 Tab 3   • valacyclovir (VALTREX) 1 GM Tab Take 2 Tabs by mouth 2 times a day. 30 Tab 12   • therapeutic multivitamin-minerals (THERAGRAN-M) TABS Take 1 Tab by mouth every day.     • aspirin EC (ECOTRIN) 81 MG TBEC Take 1 Tab by mouth every day. 30 Tab      No current Epic-ordered facility-administered medications on file.

## 2019-06-11 ENCOUNTER — HOSPITAL ENCOUNTER (OUTPATIENT)
Facility: MEDICAL CENTER | Age: 54
End: 2019-06-11
Attending: FAMILY MEDICINE
Payer: COMMERCIAL

## 2019-06-11 ENCOUNTER — APPOINTMENT (OUTPATIENT)
Dept: OTHER | Facility: MEDICAL CENTER | Age: 54
End: 2019-06-11
Payer: COMMERCIAL

## 2019-06-11 DIAGNOSIS — N52.9 ERECTILE DYSFUNCTION, UNSPECIFIED ERECTILE DYSFUNCTION TYPE: ICD-10-CM

## 2019-06-11 DIAGNOSIS — F10.10 ALCOHOL ABUSE, EPISODIC: ICD-10-CM

## 2019-06-11 DIAGNOSIS — R73.01 ELEVATED FASTING GLUCOSE: ICD-10-CM

## 2019-06-11 DIAGNOSIS — E78.2 MIXED HYPERLIPIDEMIA WITH APOLIPOPROTEIN E3 VARIANT: ICD-10-CM

## 2019-06-11 LAB
ALBUMIN SERPL BCP-MCNC: 4.4 G/DL (ref 3.2–4.9)
ALBUMIN/GLOB SERPL: 1.4 G/DL
ALP SERPL-CCNC: 56 U/L (ref 30–99)
ALT SERPL-CCNC: 25 U/L (ref 2–50)
ANION GAP SERPL CALC-SCNC: 6 MMOL/L (ref 0–11.9)
AST SERPL-CCNC: 19 U/L (ref 12–45)
BASOPHILS # BLD AUTO: 0.9 % (ref 0–1.8)
BASOPHILS # BLD: 0.05 K/UL (ref 0–0.12)
BILIRUB SERPL-MCNC: 0.8 MG/DL (ref 0.1–1.5)
BUN SERPL-MCNC: 21 MG/DL (ref 8–22)
CALCIUM SERPL-MCNC: 10.1 MG/DL (ref 8.5–10.5)
CHLORIDE SERPL-SCNC: 106 MMOL/L (ref 96–112)
CO2 SERPL-SCNC: 26 MMOL/L (ref 20–33)
CREAT SERPL-MCNC: 1.01 MG/DL (ref 0.5–1.4)
CREAT UR-MCNC: 160.6 MG/DL
EOSINOPHIL # BLD AUTO: 0.26 K/UL (ref 0–0.51)
EOSINOPHIL NFR BLD: 4.6 % (ref 0–6.9)
ERYTHROCYTE [DISTWIDTH] IN BLOOD BY AUTOMATED COUNT: 44 FL (ref 35.9–50)
GLOBULIN SER CALC-MCNC: 3.1 G/DL (ref 1.9–3.5)
GLUCOSE SERPL-MCNC: 90 MG/DL (ref 65–99)
HCT VFR BLD AUTO: 49.6 % (ref 42–52)
HGB BLD-MCNC: 16.5 G/DL (ref 14–18)
IMM GRANULOCYTES # BLD AUTO: 0.01 K/UL (ref 0–0.11)
IMM GRANULOCYTES NFR BLD AUTO: 0.2 % (ref 0–0.9)
LYMPHOCYTES # BLD AUTO: 1.89 K/UL (ref 1–4.8)
LYMPHOCYTES NFR BLD: 33.4 % (ref 22–41)
MCH RBC QN AUTO: 32.5 PG (ref 27–33)
MCHC RBC AUTO-ENTMCNC: 33.3 G/DL (ref 33.7–35.3)
MCV RBC AUTO: 97.6 FL (ref 81.4–97.8)
MICROALBUMIN UR-MCNC: <0.7 MG/DL
MICROALBUMIN/CREAT UR: NORMAL MG/G (ref 0–30)
MONOCYTES # BLD AUTO: 0.54 K/UL (ref 0–0.85)
MONOCYTES NFR BLD AUTO: 9.5 % (ref 0–13.4)
NEUTROPHILS # BLD AUTO: 2.91 K/UL (ref 1.82–7.42)
NEUTROPHILS NFR BLD: 51.4 % (ref 44–72)
NRBC # BLD AUTO: 0 K/UL
NRBC BLD-RTO: 0 /100 WBC
PLATELET # BLD AUTO: 204 K/UL (ref 164–446)
PMV BLD AUTO: 10.5 FL (ref 9–12.9)
POTASSIUM SERPL-SCNC: 4.5 MMOL/L (ref 3.6–5.5)
PROT SERPL-MCNC: 7.5 G/DL (ref 6–8.2)
RBC # BLD AUTO: 5.08 M/UL (ref 4.7–6.1)
SODIUM SERPL-SCNC: 138 MMOL/L (ref 135–145)
TSH SERPL DL<=0.005 MIU/L-ACNC: 1.88 UIU/ML (ref 0.38–5.33)
WBC # BLD AUTO: 5.7 K/UL (ref 4.8–10.8)

## 2019-06-13 DIAGNOSIS — R73.01 ELEVATED FASTING GLUCOSE: ICD-10-CM

## 2019-06-13 LAB
APO B100 SERPL-MCNC: 129 MG/DL (ref 55–140)
SHBG SERPL-SCNC: 103 NMOL/L (ref 11–80)
TESTOST FREE MFR SERPL: 0.9 % (ref 1.6–2.9)
TESTOST FREE SERPL-MCNC: 62 PG/ML (ref 47–244)
TESTOST SERPL-MCNC: 696 NG/DL (ref 300–890)

## 2019-06-14 LAB
EST. AVERAGE GLUCOSE BLD GHB EST-MCNC: 114 MG/DL
HBA1C MFR BLD: 5.6 % (ref 0–5.6)

## 2019-06-15 LAB
CHOLEST SERPL-MCNC: 249 MG/DL
HDL PARTICAL NO Q4363: 32.9 UMOL/L
HDL SIZE Q4361: 8.5 NM
HDLC SERPL-MCNC: 47 MG/DL (ref 40–59)
HLD.LARGE SERPL-SCNC: <2.8 UMOL/L
L VLDL PART NO Q4357: 2.1 NMOL/L
LDL SERPL QN: 20.5 NM
LDL SERPL-SCNC: 2238 NMOL/L
LDL SMALL SERPL-SCNC: >1085 NMOL/L
LDLC SERPL CALC-MCNC: 179 MG/DL
PATHOLOGY STUDY: ABNORMAL
TRIGL SERPL-MCNC: 113 MG/DL (ref 30–149)
VLDL SIZE Q4362: 47.2 NM

## 2019-06-18 DIAGNOSIS — G47.9 SLEEP DISORDER: ICD-10-CM

## 2019-06-18 RX ORDER — TRAZODONE HYDROCHLORIDE 50 MG/1
50-100 TABLET ORAL NIGHTLY PRN
Qty: 90 TAB | Refills: 3 | Status: SHIPPED
Start: 2019-06-18 | End: 2019-08-17

## 2019-06-26 ENCOUNTER — TELEPHONE (OUTPATIENT)
Dept: OTHER | Facility: MEDICAL CENTER | Age: 54
End: 2019-06-26

## 2019-07-24 ENCOUNTER — OFFICE VISIT (OUTPATIENT)
Dept: OTHER | Facility: MEDICAL CENTER | Age: 54
End: 2019-07-24
Payer: COMMERCIAL

## 2019-07-24 VITALS
HEART RATE: 59 BPM | HEIGHT: 70 IN | SYSTOLIC BLOOD PRESSURE: 102 MMHG | RESPIRATION RATE: 12 BRPM | WEIGHT: 207.4 LBS | DIASTOLIC BLOOD PRESSURE: 70 MMHG | TEMPERATURE: 98.3 F | BODY MASS INDEX: 29.69 KG/M2 | OXYGEN SATURATION: 97 %

## 2019-07-24 DIAGNOSIS — E66.3 OVERWEIGHT (BMI 25.0-29.9): Primary | ICD-10-CM

## 2019-07-24 DIAGNOSIS — Z71.84 TRAVEL ADVICE ENCOUNTER: ICD-10-CM

## 2019-07-24 PROCEDURE — 99214 OFFICE O/P EST MOD 30 MIN: CPT | Performed by: FAMILY MEDICINE

## 2019-07-24 RX ORDER — AZITHROMYCIN 250 MG/1
TABLET, FILM COATED ORAL
Qty: 12 TAB | Refills: 3 | Status: SHIPPED | OUTPATIENT
Start: 2019-07-24 | End: 2021-09-25

## 2019-07-24 RX ORDER — CIPROFLOXACIN 500 MG/1
500 TABLET, FILM COATED ORAL 2 TIMES DAILY
Qty: 20 TAB | Refills: 0 | Status: SHIPPED | OUTPATIENT
Start: 2019-07-24 | End: 2021-09-25

## 2019-07-24 NOTE — PROGRESS NOTES
SUBJECTIVE:    Chief Complaint   Patient presents with   • Other     Overweight status   • Travel Consult       Mc Sosa is a 54 y.o. male,   Established Patient     PROBLEM #1-HISTORY OF PRESENT ILLNESS  Existing Problem, but requiring re-evaluation  PATIENT STATEMENT OF PROBLEM - Overweight, dyslipidemia  ONSET - years  COURSE - he is down 12 lbs in past 2 months, with Ketogenic diet. He realizes he cannot continue with this nutrition plan. It has been 108 days since his last ETOH.   INTENSITY/STATUS/LOCATION/RADIATION - moderate/ improved  AGGRAVATORS - as above, Multifactorial   RELIEVERS - as above  TREATMENTS/COMPLIANCE/@GOAL? - same/ improving/ moving toward goal    PROBLEM #2-HISTORY OF PRESENT ILLNESS  New Problem  PATIENT STATEMENT OF PROBLEM - Travel to [place]  ONSET - next week  COURSE - he would like refills of his Cipro and Azithromycin for travel.     No Known Allergies    Patient Active Problem List    Diagnosis Date Noted   • Executive History and Physical Examination 08/28/2017   • Overweight (BMI 25.0-29.9) 08/28/2017   • Carotid atherosclerosis 08/28/2017   • Biceps tendinitis of left shoulder 08/16/2017   • Impingement syndrome of left shoulder region 08/16/2017   • Near complete tear of left supraspinatus tendon 08/16/2017   • Partial tear of left subscapularis tendon 08/16/2017   • Agatston CAC score, <100 06/28/2017   • Recurrent oral herpes simplex 06/28/2017   • Right ankle pain 09/06/2016   • DJD (degenerative joint disease), ankle and foot 09/06/2016   • Status post ORIF of fracture of ankle 09/06/2016   • Frequent headaches 06/22/2016   • Dysthymia 06/22/2016   • Coronary atherosclerosis due to calcified coronary lesion of native artery 02/12/2016   • Alcohol abuse, episodic 02/14/2014   • Mixed hyperlipidemia with apolipoprotein E3 variant 12/18/2013   • Disturbance in sleep behavior 12/18/2013   • Stress reaction 10/07/2013   • Insomnia 10/07/2013       Outpatient  "Encounter Prescriptions as of 7/24/2019   Medication Sig Dispense Refill   • ciprofloxacin (CIPRO) 500 MG Tab Take 1 Tab by mouth 2 times a day. 20 Tab 0   • azithromycin (ZITHROMAX) 250 MG Tab Two by mouth on Day One, then one by mouth daily on days 2-5. 12 Tab 3   • traZODone (DESYREL) 50 MG Tab Take 1-2 Tabs by mouth at bedtime as needed for Sleep for up to 60 days. 90 Tab 3   • FLUoxetine (PROZAC) 20 MG Cap Take 1 Cap by mouth every day. 90 Cap 0   • naltrexone (DEPADE) 50 MG Tab Take 1 Tab by mouth every day. 90 Tab 0   • disulfiram (ANTABUSE) 250 MG Tab Take 1 Tab by mouth every day. 180 Tab 1   • ALPRAZolam (XANAX) 0.5 MG Tab TAKE ONE TABLET BY MOUTH THREE TIMES DAILY AS NEEDED ANXIETY 30 Tab 1   • [DISCONTINUED] ciprofloxacin (CIPRO) 500 MG Tab Take 1 Tab by mouth 2 times a day. 28 Tab 3   • [DISCONTINUED] azithromycin (ZITHROMAX) 250 MG Tab Two by mouth on Day One, then one by mouth daily on days 2-5. 12 Tab 3   • valacyclovir (VALTREX) 1 GM Tab Take 2 Tabs by mouth 2 times a day. 30 Tab 12   • therapeutic multivitamin-minerals (THERAGRAN-M) TABS Take 1 Tab by mouth every day.     • aspirin EC (ECOTRIN) 81 MG TBEC Take 1 Tab by mouth every day. 30 Tab      No facility-administered encounter medications on file as of 7/24/2019.        Social History   Substance Use Topics   • Smoking status: Never Smoker   • Smokeless tobacco: Never Used   • Alcohol use No      Comment: No alcohol currently. Hx of alcoholism       No family history on file.    Patient's Past, Social, and Family History reviewed and updated by me in EPIC today.    REVIEW OF SYMPTOMS:               Pertinent Positives as above.    All other systems reviewed and negative.     OBJECTIVE:    /70   Pulse (!) 59   Temp 36.8 °C (98.3 °F) (Temporal)   Resp 12   Ht 1.778 m (5' 10\")   Wt 94.1 kg (207 lb 6.4 oz)   SpO2 97%   BMI 29.76 kg/m²   Body mass index is 29.76 kg/m².    Well developed, well nourished male, no acute distress, non-ill " appearing. Comfortable, appears stated age, pleasant and cooperative  HEAD: atraumatic, normocephalic   EYES: Conjunctiva normal, extra-occular movements intact, PERRLA, acuity grossly intact.   EARS/NOSE/THROAT: TM's normal, no signs or symptoms of infection, no perforation, no hemotympanum, acuity grossly intact. Oropharynx: benign, no lesions noted. Nares: benign.   NECK: supple, no adenopathy, no thyromegaly or nodules, no jugular vein distention, no carotid bruits.   CHEST/LUNGS: clear to auscultation and percussion bilaterally. No adventitious breath sounds.   CARDIOVASCULAR: regular rate and rhythm, no murmur. Point of maximum intensity not displaced. Good central and peripheral pulses.   BACK: no CVA tenderness.   ABDOMEN: soft, non-tender, non-distended, no masses, no hepatosplenomegaly. Normal active bowel tones.   : deferred.   Rectal: deferred.   Extremities: warm/well-perfused, no cyanosis, clubbing, or edema.   SKIN: clear, unbroken, no rashes, normal turgor.   Neuro: Mental Status: Alert and Oriented x 3. CN II-XII grossly intact. Gait normal. Non-focal, intact. Normal strength, sensation    ASSESSMENT:    1. Overweight (BMI 25.0-29.9)     2. Travel advice encounter  ciprofloxacin (CIPRO) 500 MG Tab    azithromycin (ZITHROMAX) 250 MG Tab       PLAN:    Total Face-to-Face time spent with patient: 25 minutes  Amount of time spent counseling patient and/or coordinating care: 15 minutes    The nature of patient counseling as below:  -Patient Education, including below topics:  -Differential Diagnoses and treatment options discussed  -Risks, benefits, alternatives discussed  -Health Maintenance Exam issues discussed  -Therapeutic Lifestyle Changes discussed    The nature of coordination of care as below:  -Medications added/refilled: Cipro and Azithromycin  -Medications discontinued: none  -Medications adjusted: none  -Continue (other) present chronic medications  -Seek medical attention immediately if  worse    FOLLOW-UP:  -in 1 month  -and sooner if test/consult results warrant  And for Health Care Maintenance Exams  And as needed.

## 2019-07-24 NOTE — PATIENT INSTRUCTIONS
Current Outpatient Prescriptions Ordered in [facility]   Medication Sig Dispense Refill   • ciprofloxacin (CIPRO) 500 MG Tab Take 1 Tab by mouth 2 times a day. 20 Tab 0   • azithromycin (ZITHROMAX) 250 MG Tab Two by mouth on Day One, then one by mouth daily on days 2-5. 12 Tab 3   • traZODone (DESYREL) 50 MG Tab Take 1-2 Tabs by mouth at bedtime as needed for Sleep for up to 60 days. 90 Tab 3   • FLUoxetine (PROZAC) 20 MG Cap Take 1 Cap by mouth every day. 90 Cap 0   • naltrexone (DEPADE) 50 MG Tab Take 1 Tab by mouth every day. 90 Tab 0   • disulfiram (ANTABUSE) 250 MG Tab Take 1 Tab by mouth every day. 180 Tab 1   • ALPRAZolam (XANAX) 0.5 MG Tab TAKE ONE TABLET BY MOUTH THREE TIMES DAILY AS NEEDED ANXIETY 30 Tab 1   • valacyclovir (VALTREX) 1 GM Tab Take 2 Tabs by mouth 2 times a day. 30 Tab 12   • therapeutic multivitamin-minerals (THERAGRAN-M) TABS Take 1 Tab by mouth every day.     • aspirin EC (ECOTRIN) 81 MG TBEC Take 1 Tab by mouth every day. 30 Tab      No current Epic-ordered facility-administered medications on file.

## 2019-08-16 DIAGNOSIS — F10.21 ALCOHOL DEPENDENCE IN REMISSION (HCC): ICD-10-CM

## 2019-08-16 RX ORDER — DISULFIRAM 250 MG/1
250 TABLET ORAL DAILY
Qty: 180 TAB | Refills: 0 | Status: SHIPPED | OUTPATIENT
Start: 2019-08-16 | End: 2019-09-27 | Stop reason: SDUPTHER

## 2019-09-27 ENCOUNTER — TELEPHONE (OUTPATIENT)
Dept: INTERNAL MEDICINE | Facility: IMAGING CENTER | Age: 54
End: 2019-09-27

## 2019-09-27 DIAGNOSIS — F10.21 ALCOHOL DEPENDENCE IN REMISSION (HCC): ICD-10-CM

## 2019-09-27 DIAGNOSIS — F10.10 ALCOHOL ABUSE: ICD-10-CM

## 2019-09-27 RX ORDER — NALTREXONE HYDROCHLORIDE 50 MG/1
50 TABLET, FILM COATED ORAL DAILY
Qty: 30 TAB | Refills: 0 | Status: SHIPPED | OUTPATIENT
Start: 2019-09-27 | End: 2021-09-25

## 2019-09-27 RX ORDER — DISULFIRAM 250 MG/1
250 TABLET ORAL DAILY
Qty: 30 TAB | Refills: 0 | Status: SHIPPED | OUTPATIENT
Start: 2019-09-27 | End: 2021-09-25

## 2019-10-08 ENCOUNTER — APPOINTMENT (OUTPATIENT)
Dept: RADIOLOGY | Facility: MEDICAL CENTER | Age: 54
End: 2019-10-08
Attending: EMERGENCY MEDICINE
Payer: COMMERCIAL

## 2019-10-08 ENCOUNTER — HOSPITAL ENCOUNTER (EMERGENCY)
Facility: MEDICAL CENTER | Age: 54
End: 2019-10-08
Attending: EMERGENCY MEDICINE
Payer: COMMERCIAL

## 2019-10-08 VITALS
RESPIRATION RATE: 16 BRPM | HEIGHT: 70 IN | HEART RATE: 99 BPM | SYSTOLIC BLOOD PRESSURE: 109 MMHG | WEIGHT: 209.44 LBS | TEMPERATURE: 98.4 F | OXYGEN SATURATION: 95 % | BODY MASS INDEX: 29.98 KG/M2 | DIASTOLIC BLOOD PRESSURE: 77 MMHG

## 2019-10-08 DIAGNOSIS — R07.9 CHEST PAIN, UNSPECIFIED TYPE: ICD-10-CM

## 2019-10-08 LAB
ALBUMIN SERPL BCP-MCNC: 4.4 G/DL (ref 3.2–4.9)
ALBUMIN/GLOB SERPL: 1.3 G/DL
ALP SERPL-CCNC: 87 U/L (ref 30–99)
ALT SERPL-CCNC: 64 U/L (ref 2–50)
ANION GAP SERPL CALC-SCNC: 17 MMOL/L (ref 0–11.9)
APTT PPP: 26.2 SEC (ref 24.7–36)
AST SERPL-CCNC: 63 U/L (ref 12–45)
BASOPHILS # BLD AUTO: 0.6 % (ref 0–1.8)
BASOPHILS # BLD: 0.06 K/UL (ref 0–0.12)
BILIRUB SERPL-MCNC: 0.5 MG/DL (ref 0.1–1.5)
BUN SERPL-MCNC: 6 MG/DL (ref 8–22)
CALCIUM SERPL-MCNC: 9.1 MG/DL (ref 8.4–10.2)
CHLORIDE SERPL-SCNC: 97 MMOL/L (ref 96–112)
CO2 SERPL-SCNC: 25 MMOL/L (ref 20–33)
CREAT SERPL-MCNC: 0.79 MG/DL (ref 0.5–1.4)
EKG IMPRESSION: NORMAL
EKG IMPRESSION: NORMAL
EOSINOPHIL # BLD AUTO: 0.01 K/UL (ref 0–0.51)
EOSINOPHIL NFR BLD: 0.1 % (ref 0–6.9)
ERYTHROCYTE [DISTWIDTH] IN BLOOD BY AUTOMATED COUNT: 46.8 FL (ref 35.9–50)
GLOBULIN SER CALC-MCNC: 3.3 G/DL (ref 1.9–3.5)
GLUCOSE SERPL-MCNC: 113 MG/DL (ref 65–99)
HCT VFR BLD AUTO: 51.5 % (ref 42–52)
HGB BLD-MCNC: 17.9 G/DL (ref 14–18)
IMM GRANULOCYTES # BLD AUTO: 0.02 K/UL (ref 0–0.11)
IMM GRANULOCYTES NFR BLD AUTO: 0.2 % (ref 0–0.9)
INR PPP: 0.88 (ref 0.87–1.13)
LIPASE SERPL-CCNC: 48 U/L (ref 7–58)
LYMPHOCYTES # BLD AUTO: 2.53 K/UL (ref 1–4.8)
LYMPHOCYTES NFR BLD: 23.8 % (ref 22–41)
MCH RBC QN AUTO: 32.5 PG (ref 27–33)
MCHC RBC AUTO-ENTMCNC: 34.8 G/DL (ref 33.7–35.3)
MCV RBC AUTO: 93.6 FL (ref 81.4–97.8)
MONOCYTES # BLD AUTO: 0.63 K/UL (ref 0–0.85)
MONOCYTES NFR BLD AUTO: 5.9 % (ref 0–13.4)
NEUTROPHILS # BLD AUTO: 7.38 K/UL (ref 1.82–7.42)
NEUTROPHILS NFR BLD: 69.4 % (ref 44–72)
NRBC # BLD AUTO: 0 K/UL
NRBC BLD-RTO: 0 /100 WBC
PLATELET # BLD AUTO: 205 K/UL (ref 164–446)
PMV BLD AUTO: 8.8 FL (ref 9–12.9)
POTASSIUM SERPL-SCNC: 4 MMOL/L (ref 3.6–5.5)
PROT SERPL-MCNC: 7.7 G/DL (ref 6–8.2)
PROTHROMBIN TIME: 12 SEC (ref 12–14.6)
RBC # BLD AUTO: 5.5 M/UL (ref 4.7–6.1)
SODIUM SERPL-SCNC: 139 MMOL/L (ref 135–145)
TROPONIN T SERPL-MCNC: 8 NG/L (ref 6–19)
TROPONIN T SERPL-MCNC: 8 NG/L (ref 6–19)
WBC # BLD AUTO: 10.6 K/UL (ref 4.8–10.8)

## 2019-10-08 PROCEDURE — 85025 COMPLETE CBC W/AUTO DIFF WBC: CPT

## 2019-10-08 PROCEDURE — 85730 THROMBOPLASTIN TIME PARTIAL: CPT

## 2019-10-08 PROCEDURE — 71045 X-RAY EXAM CHEST 1 VIEW: CPT

## 2019-10-08 PROCEDURE — 99285 EMERGENCY DEPT VISIT HI MDM: CPT

## 2019-10-08 PROCEDURE — 83690 ASSAY OF LIPASE: CPT

## 2019-10-08 PROCEDURE — 84484 ASSAY OF TROPONIN QUANT: CPT

## 2019-10-08 PROCEDURE — 36415 COLL VENOUS BLD VENIPUNCTURE: CPT

## 2019-10-08 PROCEDURE — 93005 ELECTROCARDIOGRAM TRACING: CPT

## 2019-10-08 PROCEDURE — 85610 PROTHROMBIN TIME: CPT

## 2019-10-08 PROCEDURE — 700102 HCHG RX REV CODE 250 W/ 637 OVERRIDE(OP): Performed by: EMERGENCY MEDICINE

## 2019-10-08 PROCEDURE — 93005 ELECTROCARDIOGRAM TRACING: CPT | Performed by: EMERGENCY MEDICINE

## 2019-10-08 PROCEDURE — A9270 NON-COVERED ITEM OR SERVICE: HCPCS | Performed by: EMERGENCY MEDICINE

## 2019-10-08 PROCEDURE — 80053 COMPREHEN METABOLIC PANEL: CPT

## 2019-10-08 RX ORDER — DIAZEPAM 5 MG/1
5 TABLET ORAL ONCE
Status: COMPLETED | OUTPATIENT
Start: 2019-10-08 | End: 2019-10-08

## 2019-10-08 RX ADMIN — LIDOCAINE HYDROCHLORIDE 30 ML: 20 SOLUTION OROPHARYNGEAL at 12:18

## 2019-10-08 RX ADMIN — DIAZEPAM 5 MG: 5 TABLET ORAL at 13:53

## 2019-10-08 NOTE — ED PROVIDER NOTES
ED Provider Note    CHIEF COMPLAINT  No chief complaint on file.  With chest pain.    HPI  Mc Sosa is a 54 y.o. male who presents with chest pain.  The patient went to [facility] for help with his alcohol abuse and intoxication.  He is complaining of left-sided chest pain and therefore they sent him to the emerge department for evaluation.  He states the pain is in the left side of his chest but states that he cannot characterize the pain.  He states it started while at rest.  He states the pain is mild to moderate in intensity.  He does not have associated dyspnea, nausea, nor diaphoresis.  The patient states he has hypertension.  He does take a daily aspirin.  He does not have any pain or swelling to his lower extremities nor does he have any risk factors from a DVT standpoint.    REVIEW OF SYSTEMS  See HPI for further details. All other systems are negative.     PAST MEDICAL HISTORY  Past Medical History:   Diagnosis Date   • ASTHMA    • Hypertension        FAMILY HISTORY  [unfilled]    SOCIAL HISTORY  Social History     Socioeconomic History   • Marital status:      Spouse name: Not on file   • Number of children: Not on file   • Years of education: Not on file   • Highest education level: Not on file   Occupational History   • Not on file   Social Needs   • Financial resource strain: Not on file   • Food insecurity:     Worry: Not on file     Inability: Not on file   • Transportation needs:     Medical: Not on file     Non-medical: Not on file   Tobacco Use   • Smoking status: Never Smoker   • Smokeless tobacco: Never Used   Substance and Sexual Activity   • Alcohol use: No     Comment: No alcohol currently. Hx of alcoholism   • Drug use: No   • Sexual activity: Yes     Partners: Female   Lifestyle   • Physical activity:     Days per week: Not on file     Minutes per session: Not on file   • Stress: Not on file   Relationships   • Social connections:     Talks on phone: Not on  "file     Gets together: Not on file     Attends Yazdanism service: Not on file     Active member of club or organization: Not on file     Attends meetings of clubs or organizations: Not on file     Relationship status: Not on file   • Intimate partner violence:     Fear of current or ex partner: Not on file     Emotionally abused: Not on file     Physically abused: Not on file     Forced sexual activity: Not on file   Other Topics Concern   • Not on file   Social History Narrative   • Not on file       SURGICAL HISTORY  Past Surgical History:   Procedure Laterality Date   • OTHER ORTHOPEDIC SURGERY      r ankle       CURRENT MEDICATIONS  Home Medications    **Home medications have not yet been reviewed for this encounter**         ALLERGIES  No Known Allergies    PHYSICAL EXAM  VITAL SIGNS: /104   Pulse 92   Temp 37 °C (98.6 °F) (Temporal)   Resp 17   Ht 1.778 m (5' 10\")   Wt 95 kg (209 lb 7 oz)   SpO2 92%   BMI 30.05 kg/m²  Room air O2: 92    Constitutional: Intoxicated  HENT: Normocephalic, Atraumatic, Bilateral external ears normal, Oropharynx moist, No oral exudates, Nose normal.   Eyes: PERRLA, EOMI, bilateral conjunctival injection, No discharge.   Neck: Normal range of motion, No tenderness, Supple, No stridor.   Lymphatic: No lymphadenopathy noted.   Cardiovascular: Normal heart rate, Normal rhythm, No murmurs, No rubs, No gallops.   Thorax & Lungs: Normal breath sounds, No respiratory distress, No wheezing, No chest tenderness.   Abdomen: Bowel sounds normal, Soft, No tenderness, No masses, No pulsatile masses.   Skin: Warm, Dry, No erythema, No rash.   Back: No tenderness, No CVA tenderness.    Extremities: Intact distal pulses, No edema, No tenderness, No cyanosis, No clubbing.   Musculoskeletal: Good range of motion in all major joints. No tenderness to palpation or major deformities noted.   Neurologic: Alert & oriented x 3, Normal motor function, Normal sensory function, No focal deficits " noted.   Psychiatric: Affect normal, Judgment normal, Mood normal.     Results for orders placed or performed during the hospital encounter of 10/08/19   CBC WITH DIFFERENTIAL   Result Value Ref Range    WBC 10.6 4.8 - 10.8 K/uL    RBC 5.50 4.70 - 6.10 M/uL    Hemoglobin 17.9 14.0 - 18.0 g/dL    Hematocrit 51.5 42.0 - 52.0 %    MCV 93.6 81.4 - 97.8 fL    MCH 32.5 27.0 - 33.0 pg    MCHC 34.8 33.7 - 35.3 g/dL    RDW 46.8 35.9 - 50.0 fL    Platelet Count 205 164 - 446 K/uL    MPV 8.8 (L) 9.0 - 12.9 fL    Neutrophils-Polys 69.40 44.00 - 72.00 %    Lymphocytes 23.80 22.00 - 41.00 %    Monocytes 5.90 0.00 - 13.40 %    Eosinophils 0.10 0.00 - 6.90 %    Basophils 0.60 0.00 - 1.80 %    Immature Granulocytes 0.20 0.00 - 0.90 %    Nucleated RBC 0.00 /100 WBC    Neutrophils (Absolute) 7.38 1.82 - 7.42 K/uL    Lymphs (Absolute) 2.53 1.00 - 4.80 K/uL    Monos (Absolute) 0.63 0.00 - 0.85 K/uL    Eos (Absolute) 0.01 0.00 - 0.51 K/uL    Baso (Absolute) 0.06 0.00 - 0.12 K/uL    Immature Granulocytes (abs) 0.02 0.00 - 0.11 K/uL    NRBC (Absolute) 0.00 K/uL   COMP METABOLIC PANEL   Result Value Ref Range    Sodium 139 135 - 145 mmol/L    Potassium 4.0 3.6 - 5.5 mmol/L    Chloride 97 96 - 112 mmol/L    Co2 25 20 - 33 mmol/L    Anion Gap 17.0 (H) 0.0 - 11.9    Glucose 113 (H) 65 - 99 mg/dL    Bun 6 (L) 8 - 22 mg/dL    Creatinine 0.79 0.50 - 1.40 mg/dL    Calcium 9.1 8.4 - 10.2 mg/dL    AST(SGOT) 63 (H) 12 - 45 U/L    ALT(SGPT) 64 (H) 2 - 50 U/L    Alkaline Phosphatase 87 30 - 99 U/L    Total Bilirubin 0.5 0.1 - 1.5 mg/dL    Albumin 4.4 3.2 - 4.9 g/dL    Total Protein 7.7 6.0 - 8.2 g/dL    Globulin 3.3 1.9 - 3.5 g/dL    A-G Ratio 1.3 g/dL   LIPASE   Result Value Ref Range    Lipase 48 7 - 58 U/L   TROPONIN   Result Value Ref Range    Troponin T 8 6 - 19 ng/L   APTT   Result Value Ref Range    APTT 26.2 24.7 - 36.0 sec   PROTHROMBIN TIME (INR)   Result Value Ref Range    PT 12.0 12.0 - 14.6 sec    INR 0.88 0.87 - 1.13   ESTIMATED GFR    Result Value Ref Range    GFR If African American >60 >60 mL/min/1.73 m 2    GFR If Non African American >60 >60 mL/min/1.73 m 2   TROPONIN   Result Value Ref Range    Troponin T 8 6 - 19 ng/L   EKG   Result Value Ref Range    Report       [facility]    Test Date:  2019-10-08  Pt Name:    JOSE DANIELLE              Department[facility]  MRN:        [id number]                      [facility]  Gender:     Male                         Technician: [id number]  :        1965                   Requested By:ER TRIAGE PROTOCOL  Order #:    [id number]                    Reading MD:    Measurements  Intervals                                Axis  Rate:       102                          P:          48  MO:         146                          QRS:        70  QRSD:       94                           T:          33  QT:         347  QTc:        453    Interpretive Statements  Sinus tachycardia  Compared to ECG 10/09/2010 06:28:30  Sinus rhythm no longer present           RADIOLOGY/PROCEDURES  DX-CHEST-PORTABLE (1 VIEW)   Final Result         1. No acute cardiopulmonary abnormalities are identified.            COURSE & MEDICAL DECISION MAKING  Pertinent Labs & Imaging studies reviewed. (See chart for details)  This is a 54-year-old male who presents the emergency department with substernal chest discomfort.  I suspect this is from esophagitis from his alcohol abuse.  The patient's heart score is 2 therefore is very low risk.  His EKG on arrival as well as repeat EKG does not show any ischemic changes and his troponin continues to be negative.  Again I suspect this is all from esophagitis from his alcohol abuse.  The patient is currently medically [facility].  He is instructed to follow-up with a primary care provider after his treatment for his alcohol abuse for routine health maintenance as well as further work-up.  The patient return if he  is acutely worse.  The laboratory analysis does not show any evidence of pancreatitis.  FINAL IMPRESSION  1.  Chest pain suspect esophagitis  2.  Alcohol abuse and intoxication      Disposition  The patient will be discharged in stable condition         Electronically signed by: Fabian Barlow, 10/8/2019 12:00 PM

## 2019-10-08 NOTE — ED NOTES
"Pt requested medication for \" discomfort because of withdrawal\". ERP notified and placed verbal order.   "

## 2019-10-08 NOTE — DISCHARGE INSTRUCTIONS
The patient is medically cleared for treatment at [facility]    Patient also needs to follow-up with her primary care provider within a week
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
## 2019-10-08 NOTE — ED NOTES
Patient verbalized understanding of discharge instructions, no questions at this time. VS stable, patient will ambulate to exit with d/c instructions in hand. Facesheet sent to [facility] per request..

## 2019-10-17 ENCOUNTER — TELEPHONE (OUTPATIENT)
Dept: MEDICAL GROUP | Facility: PHYSICIAN GROUP | Age: 54
End: 2019-10-17

## 2019-10-17 NOTE — TELEPHONE ENCOUNTER
Message: [facility] called and stated that they received a Rx for disulfiram for pt, pharmacist states that it comes in 500 MG but was written for 250 MG.    Is it okay to switch medication? Does not look like this pt has ever been seen by Dr. Jacob.

## 2020-02-28 ENCOUNTER — HOSPITAL ENCOUNTER (EMERGENCY)
Dept: HOSPITAL 8 - ED | Age: 55
Discharge: HOME | End: 2020-02-28
Payer: COMMERCIAL

## 2020-02-28 VITALS — WEIGHT: 242.51 LBS | BODY MASS INDEX: 28.06 KG/M2 | HEIGHT: 78 IN

## 2020-02-28 VITALS — DIASTOLIC BLOOD PRESSURE: 92 MMHG | SYSTOLIC BLOOD PRESSURE: 142 MMHG

## 2020-02-28 DIAGNOSIS — F10.129: Primary | ICD-10-CM
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
## 2020-02-28 PROCEDURE — 99283 EMERGENCY DEPT VISIT LOW MDM: CPT

## 2020-03-03 ENCOUNTER — OFFICE VISIT (OUTPATIENT)
Dept: URGENT CARE | Facility: CLINIC | Age: 55
End: 2020-03-03
Payer: COMMERCIAL

## 2020-03-03 VITALS
HEIGHT: 70 IN | BODY MASS INDEX: 31.35 KG/M2 | SYSTOLIC BLOOD PRESSURE: 132 MMHG | OXYGEN SATURATION: 99 % | HEART RATE: 72 BPM | TEMPERATURE: 98.1 F | RESPIRATION RATE: 17 BRPM | DIASTOLIC BLOOD PRESSURE: 80 MMHG | WEIGHT: 219 LBS

## 2020-03-03 DIAGNOSIS — J11.1 INFLUENZA-LIKE ILLNESS: ICD-10-CM

## 2020-03-03 DIAGNOSIS — G47.00 INSOMNIA, UNSPECIFIED TYPE: ICD-10-CM

## 2020-03-03 DIAGNOSIS — F10.21 ALCOHOL DEPENDENCE IN REMISSION (HCC): ICD-10-CM

## 2020-03-03 LAB
FLUAV+FLUBV AG SPEC QL IA: NEGATIVE
INT CON NEG: NORMAL
INT CON POS: NORMAL

## 2020-03-03 PROCEDURE — 87804 INFLUENZA ASSAY W/OPTIC: CPT | Performed by: FAMILY MEDICINE

## 2020-03-03 PROCEDURE — 99214 OFFICE O/P EST MOD 30 MIN: CPT | Performed by: FAMILY MEDICINE

## 2020-03-03 RX ORDER — ZOLPIDEM TARTRATE 5 MG/1
5 TABLET ORAL NIGHTLY PRN
Qty: 7 TAB | Refills: 0 | Status: SHIPPED | OUTPATIENT
Start: 2020-03-03 | End: 2020-03-10

## 2020-03-03 RX ORDER — BENZONATATE 200 MG/1
200 CAPSULE ORAL 3 TIMES DAILY PRN
Qty: 45 CAP | Refills: 0 | Status: SHIPPED | OUTPATIENT
Start: 2020-03-03 | End: 2021-09-25

## 2020-03-03 RX ORDER — TRAZODONE HYDROCHLORIDE 50 MG/1
TABLET ORAL
COMMUNITY
End: 2021-09-25

## 2020-03-03 ASSESSMENT — FIBROSIS 4 INDEX: FIB4 SCORE: 2.07

## 2020-03-03 NOTE — PROGRESS NOTES
Chief Complaint   Patient presents with   • Cough     productive cough , chest congestion      CC:  cough        Cough  This is a new problem. The current episode started 4 days ago. The problem has been unchanged. The problem occurs constantly. The cough is productive. Associated symptoms include : fatigue, sweats, chest congestion, but he denies:  muscle aches, fever. Pertinent negatives include no   headaches, nausea, vomiting, diarrhea,  , weight loss or wheezing. Nothing aggravates the symptoms.  Patient has tried nothing for the symptoms. There is no history of asthma.         #2.  Insomnia - reports that he is under a lot of stress due to his job, currently and states that this is making it hard for him to fall asleep      #3.  Alcohol dependence - in remission.  Takes antabuse daily      Past Medical History:   Diagnosis Date   • ASTHMA    • Hypertension          Social History     Tobacco Use   • Smoking status: Never Smoker   • Smokeless tobacco: Never Used   Substance Use Topics   • Alcohol use: No     Comment: No alcohol currently. Hx of alcoholism   • Drug use: No         Current Outpatient Medications on File Prior to Visit   Medication Sig Dispense Refill   • traZODone (DESYREL) 50 MG Tab trazodone 50 mg tablet     • naltrexone (DEPADE) 50 MG Tab Take 1 Tab by mouth every day. (Patient not taking: Reported on 3/3/2020) 30 Tab 0   • disulfiram (ANTABUSE) 250 MG Tab Take 1 Tab by mouth every day. (Patient not taking: Reported on 3/3/2020) 30 Tab 0   • FLUoxetine (PROZAC) 20 MG Cap TAKE 1 CAPSULE BY MOUTH EVERY DAY (Patient not taking: Reported on 3/3/2020) 90 Cap 0   • ciprofloxacin (CIPRO) 500 MG Tab Take 1 Tab by mouth 2 times a day. (Patient not taking: Reported on 3/3/2020) 20 Tab 0   • azithromycin (ZITHROMAX) 250 MG Tab Two by mouth on Day One, then one by mouth daily on days 2-5. (Patient not taking: Reported on 3/3/2020) 12 Tab 3   • valacyclovir (VALTREX) 1 GM Tab Take 2 Tabs by mouth 2 times a  "day. (Patient not taking: Reported on 3/3/2020) 30 Tab 12   • therapeutic multivitamin-minerals (THERAGRAN-M) TABS Take 1 Tab by mouth every day.     • aspirin EC (ECOTRIN) 81 MG TBEC Take 1 Tab by mouth every day. 30 Tab      No current facility-administered medications on file prior to visit.                 Review of Systems   Constitutional: +for sweats,  chills and malaise/fatigue.   Eyes: Negative for vision changes, d/c.    Respiratory: + for cough and sputum production.    Cardiovascular: Negative for chest pain and palpitations.   Gastrointestinal: Negative for nausea, vomiting, abdominal pain, diarrhea and constipation.   Genitourinary: Negative for dysuria, urgency and frequency.   Skin: Negative for rash or  itching.   Neurological: Negative for dizziness and tingling.    Psychiatric/Behavioral: Negative for depression.   + insomnia  Hematologic/lymphatic - denies bruising or excessive bleeding  All other systems reviewed and are negative.         Objective:     /80 (BP Location: Left arm, Patient Position: Sitting, BP Cuff Size: Adult)   Pulse 72   Temp 36.7 °C (98.1 °F) (Temporal)   Resp 17   Ht 1.778 m (5' 10\")   Wt 99.3 kg (219 lb)   SpO2 99%     Physical Exam   Constitutional: patient is oriented to person, place, and time. Patient appears well-developed and well-nourished. No distress.   HENT:   Head: Normocephalic and atraumatic.   Right Ear: External ear normal.   Left Ear: External ear normal.   TMs both nl  Nose: Mucosal edema  present. Right sinus exhibits no maxillary sinus tenderness. Left sinus exhibits no maxillary sinus tenderness.   Mouth/Throat: Mucous membranes are normal. No oral lesions.  No posterior pharyngeal erythema.  No oropharyngeal exudate or posterior oropharyngeal edema.   Eyes: Conjunctivae and EOM are normal. Pupils are equal, round, and reactive to light. Right eye exhibits no discharge. Left eye exhibits no discharge. No scleral icterus.   Neck: Normal range " of motion. Neck supple. No tracheal deviation present.   Cardiovascular: Normal rate, regular rhythm and normal heart sounds.  Exam reveals no friction rub.    Pulmonary/Chest: Effort normal. No respiratory distress. Patient has no wheezes or rhonchi. Patient has no rales.    Musculoskeletal:  exhibits no edema.   Lymphadenopathy:     Patient has no cervical adenopathy.      Neurological: patient is alert and oriented to person, place, and time.   Skin: Skin is warm and dry. No rash noted. No erythema.   Psychiatric: patient  has a normal mood and affect.  behavior is normal.   Nursing note and vitals reviewed.              Assessment/Plan:       1. Influenza-like illness   influenza assay negative    - benzonatate (TESSALON) 200 MG capsule; Take 1 Cap by mouth 3 times a day as needed for Cough.  Dispense: 45 Cap; Refill: 0    2. Alcohol dependence in remission (HCC)   currently controlled on antabuse  - REFERRAL TO FOLLOW-UP WITH PRIMARY CARE    3. Insomnia, unspecified type  Limited rx ambien prescribed for occasional use  - zolpidem (AMBIEN) 5 MG Tab; Take 1 Tab by mouth at bedtime as needed for Sleep for up to 7 days.  Dispense: 7 Tab; Refill: 0         Follow up in one week if no improvement

## 2020-10-05 ENCOUNTER — HOSPITAL ENCOUNTER (OUTPATIENT)
Dept: LAB | Facility: MEDICAL CENTER | Age: 55
End: 2020-10-05
Attending: ORTHOPAEDIC SURGERY
Payer: COMMERCIAL

## 2020-10-05 LAB
COVID ORDER STATUS COVID19: NORMAL
SARS-COV-2 RNA RESP QL NAA+PROBE: NOTDETECTED
SPECIMEN SOURCE: NORMAL

## 2020-10-05 PROCEDURE — U0003 INFECTIOUS AGENT DETECTION BY NUCLEIC ACID (DNA OR RNA); SEVERE ACUTE RESPIRATORY SYNDROME CORONAVIRUS 2 (SARS-COV-2) (CORONAVIRUS DISEASE [COVID-19]), AMPLIFIED PROBE TECHNIQUE, MAKING USE OF HIGH THROUGHPUT TECHNOLOGIES AS DESCRIBED BY CMS-2020-01-R: HCPCS

## 2020-10-05 PROCEDURE — C9803 HOPD COVID-19 SPEC COLLECT: HCPCS

## 2021-03-15 DIAGNOSIS — Z23 NEED FOR VACCINATION: ICD-10-CM

## 2021-05-05 ENCOUNTER — HOSPITAL ENCOUNTER (EMERGENCY)
Facility: MEDICAL CENTER | Age: 56
End: 2021-05-05
Payer: COMMERCIAL

## 2021-05-05 ENCOUNTER — HOSPITAL ENCOUNTER (EMERGENCY)
Facility: MEDICAL CENTER | Age: 56
End: 2021-05-05
Attending: EMERGENCY MEDICINE
Payer: COMMERCIAL

## 2021-05-05 ENCOUNTER — APPOINTMENT (OUTPATIENT)
Dept: RADIOLOGY | Facility: MEDICAL CENTER | Age: 56
End: 2021-05-05
Attending: EMERGENCY MEDICINE
Payer: COMMERCIAL

## 2021-05-05 VITALS
SYSTOLIC BLOOD PRESSURE: 140 MMHG | RESPIRATION RATE: 18 BRPM | OXYGEN SATURATION: 98 % | DIASTOLIC BLOOD PRESSURE: 88 MMHG | TEMPERATURE: 97.9 F | HEART RATE: 89 BPM

## 2021-05-05 DIAGNOSIS — S09.90XA CLOSED HEAD INJURY, INITIAL ENCOUNTER: ICD-10-CM

## 2021-05-05 DIAGNOSIS — F10.10 ALCOHOL ABUSE: ICD-10-CM

## 2021-05-05 PROCEDURE — 99285 EMERGENCY DEPT VISIT HI MDM: CPT

## 2021-05-05 PROCEDURE — 70450 CT HEAD/BRAIN W/O DYE: CPT

## 2021-05-05 PROCEDURE — 72125 CT NECK SPINE W/O DYE: CPT

## 2021-05-05 ASSESSMENT — LIFESTYLE VARIABLES
DO YOU DRINK ALCOHOL: YES
DOES PATIENT WANT TO STOP DRINKING: NO
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
## 2021-05-05 NOTE — ED NOTES
Pt resting in [facility] without complaints.  Warm blankets provided.  Water provided.
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
## 2021-05-05 NOTE — ED NOTES
Pt calling out at times, however has no complaints when staff enter the room to check on pt. Pt noted to be wiggling down in bed - encouraged to stay in bed at this time. Pt verbalizes understanding. In clear view of nsg desk.

## 2021-05-05 NOTE — ED NOTES
Pt unable to state the year/month at this time, oriented to self and date of birth, states he is at the [facility]. C-collar remains in place, pt moving all limbs spontaneously, maintaining own airway. Rt head hematoma- bleeding controlled

## 2021-05-05 NOTE — ED PROVIDER NOTES
ED Provider Note    CHIEF COMPLAINT  Head trauma    HPI  Mc Sosa is a 56 y.o. male who presents with head trauma and altered mental status.  Patient was found laying on the ground in a puddle of blood near his home.  He was confused.  He had a number of empty alcohol bottles around him.  He was noted to have evidence of head injury.  The patient appeared intoxicated to the paramedics.  He was placed in a cervical collar and transported here.  The patient is a poor historian.  He says that he feels fine although does not appear to be reliable.    REVIEW OF SYSTEMS  Unable to obtain because of altered mental status    PAST MEDICAL HISTORY  Past Medical History:   Diagnosis Date    ASTHMA     Hypertension        SOCIAL HISTORY  Social History     Tobacco Use    Smoking status: Never Smoker    Smokeless tobacco: Never Used   Substance Use Topics    Alcohol use: No     Comment: No alcohol currently. Hx of alcoholism    Drug use: No       SURGICAL HISTORY  Past Surgical History:   Procedure Laterality Date    OTHER ORTHOPEDIC SURGERY      r ankle       CURRENT MEDICATIONS  Home Medications    **Home medications have not yet been reviewed for this encounter**         ALLERGIES  No Known Allergies    PHYSICAL EXAM  VITAL SIGNS: There were no vitals taken for this visit.   Constitutional: Patient's eyes are open he is awake and alert.    HENT: Right frontal cephalohematoma with overlying shallow well approximated laceration  Eyes: Normal inspection  Neck: Cervical hard collar.  Nontender  Cardiovascular: Normal heart rate, Normal rhythm.  Symmetric peripheral pulses.   Thorax & Lungs: No respiratory distress, No wheezing, No rales, No rhonchi, No chest tenderness.   Abdomen: Bowel sounds normal, soft, non-distended, nontender, no mass  Skin: No obvious rash.  Back: No midline tenderness  Extremities: no focal bony tenderness  Neurologic: He says yes and no but very slow to speak.  He will move his extremities  on command  Psychiatric: Normal for situation    RADIOLOGY/PROCEDURES  CT-HEAD W/O   Final Result      No intracranial abnormality.               INTERPRETING LOCATION:  [facility], [address]      CT-CSPINE WITHOUT PLUS RECONS   Final Result      No acute fracture or traumatic malalignment.           Imaging is interpreted by radiologist      COURSE & MEDICAL DECISION MAKING  Patient presents with altered mental status after being found down with multiple bottles of empty alcohol around him.  He has evidence of head injury.  CT head and cervical spine were obtained.  These were negative.  Patient's tetanus is updated.  Wound was cleansed.  It will heal well without any intervention.  Patient's vital signs were stable.  He was observed in the ER for many hours.  He woke up.  He is interactive.  Reassessed no other area of injury.  At this point he will be discharged when steady on his feet.  He is to return to the ER for concerning medical symptoms if he notices other area of injury or has concern.    FINAL IMPRESSION  1.  Closed head injury  2.  Cephalohematoma   3.  Abrasion  4.  Alcohol abuse      This dictation was created using voice recognition software. The accuracy of the dictation is limited to the abilities of the software.  The nursing notes were reviewed and certain aspects of this information were incorporated into this note.      Electronically signed by: Edmond Yen M.D., 5/5/2021 4:39 AM

## 2021-09-25 ENCOUNTER — HOSPITAL ENCOUNTER (INPATIENT)
Facility: MEDICAL CENTER | Age: 56
LOS: 2 days | DRG: 896 | End: 2021-09-27
Attending: EMERGENCY MEDICINE | Admitting: GENERAL PRACTICE
Payer: COMMERCIAL

## 2021-09-25 DIAGNOSIS — E87.1 HYPONATREMIA: ICD-10-CM

## 2021-09-25 DIAGNOSIS — E87.6 HYPOKALEMIA: ICD-10-CM

## 2021-09-25 DIAGNOSIS — F10.930 ALCOHOL WITHDRAWAL SYNDROME WITHOUT COMPLICATION (HCC): ICD-10-CM

## 2021-09-25 DIAGNOSIS — K85.20 ALCOHOL-INDUCED ACUTE PANCREATITIS, UNSPECIFIED COMPLICATION STATUS: ICD-10-CM

## 2021-09-25 PROBLEM — D69.6 THROMBOCYTOPENIA (HCC): Status: ACTIVE | Noted: 2021-09-25

## 2021-09-25 PROBLEM — R74.01 TRANSAMINITIS: Status: ACTIVE | Noted: 2021-09-25

## 2021-09-25 PROBLEM — D69.6 THROMBOCYTOPENIA (HCC): Chronic | Status: ACTIVE | Noted: 2021-09-25

## 2021-09-25 LAB
ALBUMIN SERPL BCP-MCNC: 4 G/DL (ref 3.2–4.9)
ALBUMIN/GLOB SERPL: 1.3 G/DL
ALP SERPL-CCNC: 88 U/L (ref 30–99)
ALT SERPL-CCNC: 54 U/L (ref 2–50)
ANION GAP SERPL CALC-SCNC: 16 MMOL/L (ref 7–16)
AST SERPL-CCNC: 69 U/L (ref 12–45)
BASOPHILS # BLD AUTO: 0 % (ref 0–1.8)
BASOPHILS # BLD: 0 K/UL (ref 0–0.12)
BILIRUB SERPL-MCNC: 1 MG/DL (ref 0.1–1.5)
BUN SERPL-MCNC: 19 MG/DL (ref 8–22)
CALCIUM SERPL-MCNC: 9.2 MG/DL (ref 8.4–10.2)
CHLORIDE SERPL-SCNC: 87 MMOL/L (ref 96–112)
CO2 SERPL-SCNC: 20 MMOL/L (ref 20–33)
CREAT SERPL-MCNC: 0.85 MG/DL (ref 0.5–1.4)
EKG IMPRESSION: NORMAL
EOSINOPHIL # BLD AUTO: 0 K/UL (ref 0–0.51)
EOSINOPHIL NFR BLD: 0 % (ref 0–6.9)
ERYTHROCYTE [DISTWIDTH] IN BLOOD BY AUTOMATED COUNT: 43.8 FL (ref 35.9–50)
GLOBULIN SER CALC-MCNC: 3 G/DL (ref 1.9–3.5)
GLUCOSE SERPL-MCNC: 169 MG/DL (ref 65–99)
HAV IGM SERPL QL IA: NORMAL
HBV CORE IGM SER QL: NORMAL
HBV SURFACE AG SER QL: NORMAL
HCT VFR BLD AUTO: 34.9 % (ref 42–52)
HCV AB SER QL: NORMAL
HGB BLD-MCNC: 12.8 G/DL (ref 14–18)
HIV 1+2 AB+HIV1 P24 AG SERPL QL IA: NORMAL
LIPASE SERPL-CCNC: 548 U/L (ref 7–58)
LYMPHOCYTES # BLD AUTO: 1.26 K/UL (ref 1–4.8)
LYMPHOCYTES NFR BLD: 21 % (ref 22–41)
MAGNESIUM SERPL-MCNC: 1.8 MG/DL (ref 1.5–2.5)
MANUAL DIFF BLD: NORMAL
MCH RBC QN AUTO: 32.6 PG (ref 27–33)
MCHC RBC AUTO-ENTMCNC: 36.7 G/DL (ref 33.7–35.3)
MCV RBC AUTO: 88.8 FL (ref 81.4–97.8)
METAMYELOCYTES NFR BLD MANUAL: 3 %
MONOCYTES # BLD AUTO: 0.78 K/UL (ref 0–0.85)
MONOCYTES NFR BLD AUTO: 13 % (ref 0–13.4)
NEUTROPHILS # BLD AUTO: 3.78 K/UL (ref 1.82–7.42)
NEUTROPHILS NFR BLD: 53 % (ref 44–72)
NEUTS BAND NFR BLD MANUAL: 10 % (ref 0–10)
NRBC # BLD AUTO: 0.53 K/UL
NRBC BLD-RTO: 8.9 /100 WBC
PLATELET # BLD AUTO: 130 K/UL (ref 164–446)
PLATELET BLD QL SMEAR: NORMAL
PMV BLD AUTO: 9.7 FL (ref 9–12.9)
POTASSIUM SERPL-SCNC: 3.3 MMOL/L (ref 3.6–5.5)
PROT SERPL-MCNC: 7 G/DL (ref 6–8.2)
RBC # BLD AUTO: 3.93 M/UL (ref 4.7–6.1)
RBC BLD AUTO: NORMAL
SODIUM SERPL-SCNC: 123 MMOL/L (ref 135–145)
SODIUM SERPL-SCNC: 128 MMOL/L (ref 135–145)
SODIUM SERPL-SCNC: 130 MMOL/L (ref 135–145)
VARIANT LYMPHS BLD QL SMEAR: NORMAL
WBC # BLD AUTO: 6 K/UL (ref 4.8–10.8)

## 2021-09-25 PROCEDURE — 700101 HCHG RX REV CODE 250: Performed by: EMERGENCY MEDICINE

## 2021-09-25 PROCEDURE — 36415 COLL VENOUS BLD VENIPUNCTURE: CPT

## 2021-09-25 PROCEDURE — 700102 HCHG RX REV CODE 250 W/ 637 OVERRIDE(OP): Performed by: GENERAL PRACTICE

## 2021-09-25 PROCEDURE — 83690 ASSAY OF LIPASE: CPT

## 2021-09-25 PROCEDURE — 84300 ASSAY OF URINE SODIUM: CPT

## 2021-09-25 PROCEDURE — 700111 HCHG RX REV CODE 636 W/ 250 OVERRIDE (IP): Performed by: EMERGENCY MEDICINE

## 2021-09-25 PROCEDURE — 94760 N-INVAS EAR/PLS OXIMETRY 1: CPT

## 2021-09-25 PROCEDURE — 96375 TX/PRO/DX INJ NEW DRUG ADDON: CPT

## 2021-09-25 PROCEDURE — HZ2ZZZZ DETOXIFICATION SERVICES FOR SUBSTANCE ABUSE TREATMENT: ICD-10-PCS | Performed by: EMERGENCY MEDICINE

## 2021-09-25 PROCEDURE — 84295 ASSAY OF SERUM SODIUM: CPT | Mod: 91

## 2021-09-25 PROCEDURE — 93005 ELECTROCARDIOGRAM TRACING: CPT | Performed by: EMERGENCY MEDICINE

## 2021-09-25 PROCEDURE — 99223 1ST HOSP IP/OBS HIGH 75: CPT | Performed by: GENERAL PRACTICE

## 2021-09-25 PROCEDURE — 85007 BL SMEAR W/DIFF WBC COUNT: CPT

## 2021-09-25 PROCEDURE — 700111 HCHG RX REV CODE 636 W/ 250 OVERRIDE (IP): Performed by: GENERAL PRACTICE

## 2021-09-25 PROCEDURE — 83735 ASSAY OF MAGNESIUM: CPT

## 2021-09-25 PROCEDURE — 85027 COMPLETE CBC AUTOMATED: CPT

## 2021-09-25 PROCEDURE — 99285 EMERGENCY DEPT VISIT HI MDM: CPT

## 2021-09-25 PROCEDURE — 96365 THER/PROPH/DIAG IV INF INIT: CPT

## 2021-09-25 PROCEDURE — 80074 ACUTE HEPATITIS PANEL: CPT

## 2021-09-25 PROCEDURE — 770020 HCHG ROOM/CARE - TELE (206)

## 2021-09-25 PROCEDURE — 82570 ASSAY OF URINE CREATININE: CPT

## 2021-09-25 PROCEDURE — A9270 NON-COVERED ITEM OR SERVICE: HCPCS | Performed by: GENERAL PRACTICE

## 2021-09-25 PROCEDURE — 700101 HCHG RX REV CODE 250: Performed by: GENERAL PRACTICE

## 2021-09-25 PROCEDURE — 87389 HIV-1 AG W/HIV-1&-2 AB AG IA: CPT

## 2021-09-25 PROCEDURE — 80053 COMPREHEN METABOLIC PANEL: CPT

## 2021-09-25 RX ORDER — GAUZE BANDAGE 2" X 2"
100 BANDAGE TOPICAL DAILY
Status: DISCONTINUED | OUTPATIENT
Start: 2021-09-26 | End: 2021-09-27 | Stop reason: HOSPADM

## 2021-09-25 RX ORDER — PROMETHAZINE HYDROCHLORIDE 25 MG/1
12.5-25 TABLET ORAL EVERY 4 HOURS PRN
Status: DISCONTINUED | OUTPATIENT
Start: 2021-09-25 | End: 2021-09-27 | Stop reason: HOSPADM

## 2021-09-25 RX ORDER — SODIUM CHLORIDE AND POTASSIUM CHLORIDE 150; 900 MG/100ML; MG/100ML
INJECTION, SOLUTION INTRAVENOUS CONTINUOUS
Status: DISCONTINUED | OUTPATIENT
Start: 2021-09-25 | End: 2021-09-27 | Stop reason: HOSPADM

## 2021-09-25 RX ORDER — OXYCODONE HYDROCHLORIDE 5 MG/1
2.5 TABLET ORAL
Status: DISCONTINUED | OUTPATIENT
Start: 2021-09-25 | End: 2021-09-27 | Stop reason: HOSPADM

## 2021-09-25 RX ORDER — LORAZEPAM 1 MG/1
4 TABLET ORAL
Status: DISCONTINUED | OUTPATIENT
Start: 2021-09-25 | End: 2021-09-27 | Stop reason: HOSPADM

## 2021-09-25 RX ORDER — HYDROMORPHONE HYDROCHLORIDE 1 MG/ML
0.25 INJECTION, SOLUTION INTRAMUSCULAR; INTRAVENOUS; SUBCUTANEOUS
Status: DISCONTINUED | OUTPATIENT
Start: 2021-09-25 | End: 2021-09-27 | Stop reason: HOSPADM

## 2021-09-25 RX ORDER — LORAZEPAM 1 MG/1
1 TABLET ORAL EVERY 4 HOURS PRN
Status: DISCONTINUED | OUTPATIENT
Start: 2021-09-25 | End: 2021-09-27 | Stop reason: HOSPADM

## 2021-09-25 RX ORDER — FOLIC ACID 1 MG/1
1 TABLET ORAL DAILY
Status: DISCONTINUED | OUTPATIENT
Start: 2021-09-26 | End: 2021-09-27 | Stop reason: HOSPADM

## 2021-09-25 RX ORDER — POLYETHYLENE GLYCOL 3350 17 G/17G
1 POWDER, FOR SOLUTION ORAL
Status: DISCONTINUED | OUTPATIENT
Start: 2021-09-25 | End: 2021-09-27 | Stop reason: HOSPADM

## 2021-09-25 RX ORDER — LORAZEPAM 2 MG/ML
1.5 INJECTION INTRAMUSCULAR
Status: DISCONTINUED | OUTPATIENT
Start: 2021-09-25 | End: 2021-09-27 | Stop reason: HOSPADM

## 2021-09-25 RX ORDER — PROCHLORPERAZINE EDISYLATE 5 MG/ML
5-10 INJECTION INTRAMUSCULAR; INTRAVENOUS EVERY 4 HOURS PRN
Status: DISCONTINUED | OUTPATIENT
Start: 2021-09-25 | End: 2021-09-27 | Stop reason: HOSPADM

## 2021-09-25 RX ORDER — LORAZEPAM 2 MG/ML
1 INJECTION INTRAMUSCULAR
Status: DISCONTINUED | OUTPATIENT
Start: 2021-09-25 | End: 2021-09-27 | Stop reason: HOSPADM

## 2021-09-25 RX ORDER — PROMETHAZINE HYDROCHLORIDE 25 MG/1
12.5-25 SUPPOSITORY RECTAL EVERY 4 HOURS PRN
Status: DISCONTINUED | OUTPATIENT
Start: 2021-09-25 | End: 2021-09-27 | Stop reason: HOSPADM

## 2021-09-25 RX ORDER — POTASSIUM CHLORIDE 7.45 MG/ML
10 INJECTION INTRAVENOUS ONCE
Status: DISCONTINUED | OUTPATIENT
Start: 2021-09-25 | End: 2021-09-25

## 2021-09-25 RX ORDER — LORAZEPAM 1 MG/1
2 TABLET ORAL
Status: DISCONTINUED | OUTPATIENT
Start: 2021-09-25 | End: 2021-09-27 | Stop reason: HOSPADM

## 2021-09-25 RX ORDER — ACETAMINOPHEN 325 MG/1
650 TABLET ORAL EVERY 6 HOURS PRN
Status: DISCONTINUED | OUTPATIENT
Start: 2021-09-25 | End: 2021-09-27 | Stop reason: HOSPADM

## 2021-09-25 RX ORDER — AMOXICILLIN 250 MG
2 CAPSULE ORAL 2 TIMES DAILY PRN
Status: DISCONTINUED | OUTPATIENT
Start: 2021-09-25 | End: 2021-09-27 | Stop reason: HOSPADM

## 2021-09-25 RX ORDER — LORAZEPAM 0.5 MG/1
0.5 TABLET ORAL EVERY 4 HOURS PRN
Status: DISCONTINUED | OUTPATIENT
Start: 2021-09-25 | End: 2021-09-27 | Stop reason: HOSPADM

## 2021-09-25 RX ORDER — ONDANSETRON 2 MG/ML
4 INJECTION INTRAMUSCULAR; INTRAVENOUS ONCE
Status: COMPLETED | OUTPATIENT
Start: 2021-09-25 | End: 2021-09-25

## 2021-09-25 RX ORDER — LABETALOL HYDROCHLORIDE 5 MG/ML
10 INJECTION, SOLUTION INTRAVENOUS EVERY 4 HOURS PRN
Status: DISCONTINUED | OUTPATIENT
Start: 2021-09-25 | End: 2021-09-27 | Stop reason: HOSPADM

## 2021-09-25 RX ORDER — LORAZEPAM 2 MG/ML
2 INJECTION INTRAMUSCULAR ONCE
Status: COMPLETED | OUTPATIENT
Start: 2021-09-25 | End: 2021-09-25

## 2021-09-25 RX ORDER — LORAZEPAM 2 MG/ML
0.5 INJECTION INTRAMUSCULAR EVERY 4 HOURS PRN
Status: DISCONTINUED | OUTPATIENT
Start: 2021-09-25 | End: 2021-09-27 | Stop reason: HOSPADM

## 2021-09-25 RX ORDER — ALBUTEROL SULFATE 90 UG/1
2 AEROSOL, METERED RESPIRATORY (INHALATION) EVERY 4 HOURS PRN
Status: DISCONTINUED | OUTPATIENT
Start: 2021-09-25 | End: 2021-09-27 | Stop reason: HOSPADM

## 2021-09-25 RX ORDER — LOPERAMIDE HYDROCHLORIDE 2 MG/1
2 CAPSULE ORAL 4 TIMES DAILY PRN
Status: DISCONTINUED | OUTPATIENT
Start: 2021-09-25 | End: 2021-09-27 | Stop reason: HOSPADM

## 2021-09-25 RX ORDER — LORAZEPAM 1 MG/1
3 TABLET ORAL
Status: DISCONTINUED | OUTPATIENT
Start: 2021-09-25 | End: 2021-09-27 | Stop reason: HOSPADM

## 2021-09-25 RX ORDER — BISACODYL 10 MG
10 SUPPOSITORY, RECTAL RECTAL
Status: DISCONTINUED | OUTPATIENT
Start: 2021-09-25 | End: 2021-09-27 | Stop reason: HOSPADM

## 2021-09-25 RX ORDER — OXYCODONE HYDROCHLORIDE 5 MG/1
5 TABLET ORAL
Status: DISCONTINUED | OUTPATIENT
Start: 2021-09-25 | End: 2021-09-27 | Stop reason: HOSPADM

## 2021-09-25 RX ORDER — ONDANSETRON 4 MG/1
4 TABLET, ORALLY DISINTEGRATING ORAL EVERY 4 HOURS PRN
Status: DISCONTINUED | OUTPATIENT
Start: 2021-09-25 | End: 2021-09-27 | Stop reason: HOSPADM

## 2021-09-25 RX ORDER — ONDANSETRON 2 MG/ML
4 INJECTION INTRAMUSCULAR; INTRAVENOUS EVERY 4 HOURS PRN
Status: DISCONTINUED | OUTPATIENT
Start: 2021-09-25 | End: 2021-09-27 | Stop reason: HOSPADM

## 2021-09-25 RX ORDER — LORAZEPAM 2 MG/ML
2 INJECTION INTRAMUSCULAR
Status: DISCONTINUED | OUTPATIENT
Start: 2021-09-25 | End: 2021-09-27 | Stop reason: HOSPADM

## 2021-09-25 RX ADMIN — LOPERAMIDE HYDROCHLORIDE 2 MG: 2 CAPSULE ORAL at 21:31

## 2021-09-25 RX ADMIN — ONDANSETRON 4 MG: 2 INJECTION INTRAMUSCULAR; INTRAVENOUS at 11:06

## 2021-09-25 RX ADMIN — LORAZEPAM 0.5 MG: 0.5 TABLET ORAL at 15:15

## 2021-09-25 RX ADMIN — LORAZEPAM 0.5 MG: 0.5 TABLET ORAL at 21:22

## 2021-09-25 RX ADMIN — THIAMINE HYDROCHLORIDE: 100 INJECTION, SOLUTION INTRAMUSCULAR; INTRAVENOUS at 11:06

## 2021-09-25 RX ADMIN — LORAZEPAM 2 MG: 2 INJECTION INTRAMUSCULAR; INTRAVENOUS at 11:07

## 2021-09-25 RX ADMIN — POTASSIUM CHLORIDE 10 MEQ: 7.45 INJECTION INTRAVENOUS at 13:05

## 2021-09-25 RX ADMIN — POTASSIUM CHLORIDE AND SODIUM CHLORIDE: 900; 150 INJECTION, SOLUTION INTRAVENOUS at 15:05

## 2021-09-25 RX ADMIN — ONDANSETRON 4 MG: 2 INJECTION INTRAMUSCULAR; INTRAVENOUS at 21:30

## 2021-09-25 ASSESSMENT — COGNITIVE AND FUNCTIONAL STATUS - GENERAL
MOBILITY SCORE: 24
DAILY ACTIVITIY SCORE: 24
SUGGESTED CMS G CODE MODIFIER DAILY ACTIVITY: CH
SUGGESTED CMS G CODE MODIFIER MOBILITY: CH

## 2021-09-25 ASSESSMENT — PAIN DESCRIPTION - PAIN TYPE
TYPE: ACUTE PAIN
TYPE: ACUTE PAIN

## 2021-09-25 ASSESSMENT — LIFESTYLE VARIABLES
HEADACHE, FULLNESS IN HEAD: NOT PRESENT
AUDITORY DISTURBANCES: NOT PRESENT
ON A TYPICAL DAY WHEN YOU DRINK ALCOHOL HOW MANY DRINKS DO YOU HAVE: 12
AVERAGE NUMBER OF DAYS PER WEEK YOU HAVE A DRINK CONTAINING ALCOHOL: 7
HOW MANY TIMES IN THE PAST YEAR HAVE YOU HAD 5 OR MORE DRINKS IN A DAY: 5
NAUSEA AND VOMITING: NO NAUSEA AND NO VOMITING
ORIENTATION AND CLOUDING OF SENSORIUM: ORIENTED AND CAN DO SERIAL ADDITIONS
TOTAL SCORE: 4
AUDITORY DISTURBANCES: NOT PRESENT
NAUSEA AND VOMITING: MILD NAUSEA WITH NO VOMITING
TOTAL SCORE: 4
TREMOR: *
TREMOR: *
PAROXYSMAL SWEATS: BARELY PERCEPTIBLE SWEATING. PALMS MOIST
ANXIETY: *
VISUAL DISTURBANCES: NOT PRESENT
ANXIETY: MILDLY ANXIOUS
AGITATION: *
TOTAL SCORE: 4
ORIENTATION AND CLOUDING OF SENSORIUM: ORIENTED AND CAN DO SERIAL ADDITIONS
ANXIETY: MILDLY ANXIOUS
AGITATION: NORMAL ACTIVITY
PAROXYSMAL SWEATS: BARELY PERCEPTIBLE SWEATING. PALMS MOIST
VISUAL DISTURBANCES: VERY MILD SENSITIVITY
HAVE PEOPLE ANNOYED YOU BY CRITICIZING YOUR DRINKING: YES
TREMOR: *
HEADACHE, FULLNESS IN HEAD: VERY MILD
AGITATION: SOMEWHAT MORE THAN NORMAL ACTIVITY
TOTAL SCORE: 5
HAVE YOU EVER FELT YOU SHOULD CUT DOWN ON YOUR DRINKING: YES
PAROXYSMAL SWEATS: *
HEADACHE, FULLNESS IN HEAD: VERY MILD
EVER FELT BAD OR GUILTY ABOUT YOUR DRINKING: YES
DO YOU DRINK ALCOHOL: YES
CONSUMPTION TOTAL: POSITIVE
TOTAL SCORE: 6
ORIENTATION AND CLOUDING OF SENSORIUM: ORIENTED AND CAN DO SERIAL ADDITIONS
AUDITORY DISTURBANCES: NOT PRESENT
NAUSEA AND VOMITING: NO NAUSEA AND NO VOMITING
VISUAL DISTURBANCES: NOT PRESENT
TOTAL SCORE: 11
EVER HAD A DRINK FIRST THING IN THE MORNING TO STEADY YOUR NERVES TO GET RID OF A HANGOVER: YES

## 2021-09-25 ASSESSMENT — ENCOUNTER SYMPTOMS
WEAKNESS: 1
TREMORS: 1
NAUSEA: 1
ABDOMINAL PAIN: 1

## 2021-09-25 ASSESSMENT — FIBROSIS 4 INDEX
FIB4 SCORE: 3.97
FIB4 SCORE: 2.11

## 2021-09-25 NOTE — ASSESSMENT & PLAN NOTE
Secondary to alcohol use disorder  Hepatitis panel and HIV panel negative, RUQ with no evidence of cirrhosis  Avoid any hepatotoxic agents

## 2021-09-25 NOTE — PROGRESS NOTES
Received report from ER, pt up to room 224-2. AAOx4. Denies pain or nausea at this time. Pt updated on plan of care for the day and answered any questions. Safety precautions in place, bed alarm is on.     COVID 19 surge in effect.

## 2021-09-25 NOTE — ED PROVIDER NOTES
ED Provider Note    ER PROVIDER NOTE        CHIEF COMPLAINT  Chief Complaint   Patient presents with   • ETOH Withdrawal     WED night last drink.         HPI  Mc Sosa is a 56 y.o. male who presents to the emergency department complaining of vomiting and abdominal pain.  Patient reports he was on a 21-day alcohol binge drinking 2 pints of vodka daily, he wanted to stop so on Wednesday had 4 pints of vodka and then has stopped drinking alcohol since.  On Thursday he began to feel nauseated and had multiple episodes of nonbloody emesis, emesis is somewhat improved although he is still having intermittent vomiting.  He reports crampy upper abdominal pain as well.  Began having diarrhea, nonbloody yesterday also.  No fevers, no chest pain or shortness of breath.  He does report feeling shaky and anxious, no seizures and no history of seizures with alcohol withdrawal in the past.    REVIEW OF SYSTEMS  Pertinent positives include vomiting. Pertinent negatives include no fever. See HPI for details. All other systems reviewed and are negative.    PAST MEDICAL HISTORY   has a past medical history of ASTHMA and Hypertension.    SURGICAL HISTORY   has a past surgical history that includes other orthopedic surgery.    FAMILY HISTORY  No family history on file.    SOCIAL HISTORY  Social History     Socioeconomic History   • Marital status:      Spouse name: Not on file   • Number of children: Not on file   • Years of education: Not on file   • Highest education level: Not on file   Occupational History   • Not on file   Tobacco Use   • Smoking status: Never Smoker   • Smokeless tobacco: Never Used   Substance and Sexual Activity   • Alcohol use: No     Comment: No alcohol currently. Hx of alcoholism   • Drug use: No   • Sexual activity: Yes     Partners: Female   Other Topics Concern   • Not on file   Social History Narrative   • Not on file     Social Determinants of Health     Financial Resource Strain:   "  • Difficulty of Paying Living Expenses:    Food Insecurity:    • Worried About Running Out of Food in the Last Year:    • Ran Out of Food in the Last Year:    Transportation Needs:    • Lack of Transportation (Medical):    • Lack of Transportation (Non-Medical):    Physical Activity:    • Days of Exercise per Week:    • Minutes of Exercise per Session:    Stress:    • Feeling of Stress :    Social Connections:    • Frequency of Communication with Friends and Family:    • Frequency of Social Gatherings with Friends and Family:    • Attends Anabaptism Services:    • Active Member of Clubs or Organizations:    • Attends Club or Organization Meetings:    • Marital Status:    Intimate Partner Violence:    • Fear of Current or Ex-Partner:    • Emotionally Abused:    • Physically Abused:    • Sexually Abused:       Social History     Substance and Sexual Activity   Drug Use No       CURRENT MEDICATIONS  Home Medications     Reviewed by Walker Andrea (Pharmacy Tech) on 09/25/21 at 1027  Med List Status: Complete   Medication Last Dose Status        Patient Mike Taking any Medications                       ALLERGIES  No Known Allergies    PHYSICAL EXAM  VITAL SIGNS: /76   Pulse (!) 124   Temp 37.2 °C (98.9 °F) (Temporal)   Resp 18   Ht 1.778 m (5' 10\")   Wt 95.3 kg (210 lb)   SpO2 98%   BMI 30.13 kg/m²   Pulse ox interpretation: I interpret this pulse ox as normal.    Constitutional: Alert, anxious appearing  HENT: No signs of trauma, Bilateral external ears normal, Nose normal.  No tongue fasciculations, mucous membranes dry  Eyes: Pupils are equal and reactive, Conjunctiva normal, Non-icteric.   Neck: Normal range of motion, No tenderness, Supple, No stridor.    Cardiovascular: Tachycardic, regular, no murmurs.   Thorax & Lungs: Normal breath sounds, No respiratory distress, No wheezing, No chest tenderness.   Abdomen: Bowel sounds normal, Soft, mild epigastric tenderness, No masses, No pulsatile " masses. No peritoneal signs.  Skin: Warm, Dry, No erythema, No rash.   Back: No bony tenderness, No CVA tenderness.   Extremities: Intact distal pulses, No edema, No tenderness, No cyanosis, Negative Ame's sign.  Musculoskeletal: Good range of motion in all major joints. No tenderness to palpation or major deformities noted.   Neurologic: Alert , mildly tremulous normal motor function, Normal sensory function, No focal deficits noted.   Psychiatric: Anxious judgment normal,    DIAGNOSTIC STUDIES / PROCEDURES    EKG  Results for orders placed or performed during the hospital encounter of 09/25/21   CBC WITH DIFFERENTIAL   Result Value Ref Range    WBC 6.0 4.8 - 10.8 K/uL    RBC 3.93 (L) 4.70 - 6.10 M/uL    Hemoglobin 12.8 (L) 14.0 - 18.0 g/dL    Hematocrit 34.9 (L) 42.0 - 52.0 %    MCV 88.8 81.4 - 97.8 fL    MCH 32.6 27.0 - 33.0 pg    MCHC 36.7 (H) 33.7 - 35.3 g/dL    RDW 43.8 35.9 - 50.0 fL    Platelet Count 130 (L) 164 - 446 K/uL    MPV 9.7 9.0 - 12.9 fL    Neutrophils-Polys 53.00 44.00 - 72.00 %    Lymphocytes 21.00 (L) 22.00 - 41.00 %    Monocytes 13.00 0.00 - 13.40 %    Eosinophils 0.00 0.00 - 6.90 %    Basophils 0.00 0.00 - 1.80 %    Nucleated RBC 8.90 /100 WBC    Neutrophils (Absolute) 3.78 1.82 - 7.42 K/uL    Lymphs (Absolute) 1.26 1.00 - 4.80 K/uL    Monos (Absolute) 0.78 0.00 - 0.85 K/uL    Eos (Absolute) 0.00 0.00 - 0.51 K/uL    Baso (Absolute) 0.00 0.00 - 0.12 K/uL    NRBC (Absolute) 0.53 K/uL   COMP METABOLIC PANEL   Result Value Ref Range    Sodium 123 (L) 135 - 145 mmol/L    Potassium 3.3 (L) 3.6 - 5.5 mmol/L    Chloride 87 (L) 96 - 112 mmol/L    Co2 20 20 - 33 mmol/L    Anion Gap 16.0 7.0 - 16.0    Glucose 169 (H) 65 - 99 mg/dL    Bun 19 8 - 22 mg/dL    Creatinine 0.85 0.50 - 1.40 mg/dL    Calcium 9.2 8.4 - 10.2 mg/dL    AST(SGOT) 69 (H) 12 - 45 U/L    ALT(SGPT) 54 (H) 2 - 50 U/L    Alkaline Phosphatase 88 30 - 99 U/L    Total Bilirubin 1.0 0.1 - 1.5 mg/dL    Albumin 4.0 3.2 - 4.9 g/dL    Total  Protein 7.0 6.0 - 8.2 g/dL    Globulin 3.0 1.9 - 3.5 g/dL    A-G Ratio 1.3 g/dL   LIPASE   Result Value Ref Range    Lipase 548 (H) 7 - 58 U/L   MAGNESIUM   Result Value Ref Range    Magnesium 1.8 1.5 - 2.5 mg/dL   ESTIMATED GFR   Result Value Ref Range    GFR If African American >60 >60 mL/min/1.73 m 2    GFR If Non African American >60 >60 mL/min/1.73 m 2   PLATELET ESTIMATE   Result Value Ref Range    Plt Estimation Decreased    MORPHOLOGY   Result Value Ref Range    RBC Morphology Normal     Reactive Lymphocytes Few    DIFFERENTIAL MANUAL   Result Value Ref Range    Bands-Stabs 10.00 0.00 - 10.00 %    Metamyelocytes 3.00 %    Manual Diff Status PERFORMED    EKG (Now)   Result Value Ref Range    Report       [facility]    Test Date:  2021  Pt Name:    JOSE DANIELLE              Department: [facility]  MRN:        [id number]                      [facility]  Gender:     Male                         Technician: [id number]  :        1965                   Requested By:JAMES DANIELLE  Order #:    [id number]                    Reading MD: JAMES DANIELLE MD    Measurements  Intervals                                Axis  Rate:       117                          P:          63  NY:         144                          QRS:        75  QRSD:       82                           T:          33  QT:         324  QTc:        452    Interpretive Statements  SINUS TACHYCARDIA  Compared to ECG 10/08/2019 14:16:40  Sinus rhythm no longer present  Electronically Signed On 2021 10:31:47 PDT by JAMES DANIELLE MD           RADIOLOGY  No orders to display     The radiologist's interpretation of all radiological studies have been reviewed and images independently viewed by me.    COURSE & MEDICAL DECISION MAKING  Nursing notes, VS, PMSFHx reviewed in chart.    10:26 AM Patient seen and examined at bedside. Patient will be treated with IV fluid, Ativan, Zofran. Ordered for  CBC, metabolic panel, magnesium, lipase, ECG to evaluate his symptoms.     Patient's initial labs returned, ordered for 10 mEq potassium    11:20 AM  Patient reevaluated, nausea is improved, he is still tachycardic, minimally tremulous updated on results, plan for hospitalization    HYDRATION: Based on the patient's presentation of Acute Vomiting and CIWA the patient was given IV fluids. IV Hydration was used because oral hydration was not as rapid as required. Upon recheck following hydration, the patient was improved.    Decision Making:  This is a 56 y.o. male presenting with vomiting in the setting of excessive alcohol use. Over the last 3 weeks, patient has been drinking alcohol and he reports not really eating any food. He is significantly hyponatremic at 123 likely related to the above. He also has pancreatitis with a lipase greater than 500 similarly I feel from his alcohol abuse rather than other biliary pathology. He has been started on IV fluids with a detox bag, Zofran for his symptoms as well as Ativan for his alcohol withdrawal and initial potassium replacement.     Patient is admitted in guarded condition    FINAL IMPRESSION  1. Alcohol withdrawal syndrome without complication (HCC)    2. Hyponatremia    3. Hypokalemia    4. Alcohol-induced acute pancreatitis, unspecified complication status          The note accurately reflects work and decisions made by me.  Lalo Sosa M.D.  9/25/2021  12:18 PM

## 2021-09-25 NOTE — ED NOTES
ERP at bedside. Pt agrees with plan of care discussed by ERP. AIDET acknowledged with patient. IV established. Blood sent to lab. Medicinal interventions carried out per ERP orders.   Aldenrdarin in low position, side rail up for pt safety. Call light within reach. Will continue to monitor.

## 2021-09-25 NOTE — ASSESSMENT & PLAN NOTE
Last drink was 4 days ago  CIWA protocol in place   Aspiration, seizure, fall precautions in place  Detox bag in ED  Started on MV, thiamine and folic acid

## 2021-09-25 NOTE — H&P
Hospital Medicine History & Physical Note    Date of Service  9/25/2021    Primary Care Physician  Pcp Pt States None    Consultants  None    Specialist Names: N/A    Code Status  Full Code    Chief Complaint  Chief Complaint   Patient presents with   • ETOH Withdrawal     WED night last drink.         History of Presenting Illness  Mc Sosa is a 56 y.o. male with PMHx of alcohol use disorder, insomnia, and obesity who presented 9/25/2021 with abdominal pain, nausea and vomiting.    Patient reports he used to drink heavily a couple years ago quit for 2 years and attended AA, however in light of the recent pandemic patient has started to drink again.  Patient reports he is motivated to quit drinking and would like to attend AA meetings again.    Patient reports he has been on a 21-day binge of drinking vodka daily, up to 4 pints.  Last drink was on 9/22/2021. He denies any prior history of seizure-like activity, withdrawal symptoms, or intubations. CIWA on admission was 12.    Started on IVF, CIWA protocol, CLD, will ADAT.  Labs significant for thrombocytopenia, hyponatremia, hypokalemia, transaminitis, and pancreatitis.  We will continue to monitor serial sodiums.     I discussed the plan of care with patient and bedside RN.    Review of Systems  Review of Systems   Constitutional: Positive for malaise/fatigue.   Gastrointestinal: Positive for abdominal pain and nausea.   Neurological: Positive for tremors and weakness.   All other systems reviewed and are negative.      Past Medical History   has a past medical history of ASTHMA and Hypertension.    Surgical History   has a past surgical history that includes other orthopedic surgery.     Family History  Family history is unknown by patient.   Family history reviewed with patient. There is no family history that is pertinent to the chief complaint.     Social History   reports that he has never smoked. He has never used smokeless tobacco. He reports  current alcohol use. He reports that he does not use drugs.    Allergies  No Known Allergies    Medications  None       Physical Exam  Temp:  [37.2 °C (98.9 °F)] 37.2 °C (98.9 °F)  Pulse:  [124] 124  Resp:  [18] 18  BP: (113)/(76) 113/76  SpO2:  [98 %] 98 %  Blood Pressure: 113/76   Temperature: 37.2 °C (98.9 °F)   Pulse: (!) 124   Respiration: 18   Pulse Oximetry: 98 %       Physical Exam  Vitals and nursing note reviewed.   Constitutional:       General: He is not in acute distress.     Comments: Unkempt   HENT:      Head: Normocephalic and atraumatic.   Eyes:      Extraocular Movements: Extraocular movements intact.      Conjunctiva/sclera: Conjunctivae normal.      Pupils: Pupils are equal, round, and reactive to light.   Cardiovascular:      Rate and Rhythm: Regular rhythm. Tachycardia present.      Pulses: Normal pulses.      Heart sounds: No murmur heard.   No friction rub. No gallop.    Pulmonary:      Effort: Pulmonary effort is normal. No respiratory distress.      Breath sounds: Normal breath sounds. No wheezing, rhonchi or rales.   Abdominal:      General: Bowel sounds are normal. There is no distension.      Palpations: Abdomen is soft.      Tenderness: There is abdominal tenderness (epigastric).   Musculoskeletal:         General: No swelling or tenderness. Normal range of motion.      Cervical back: Normal range of motion and neck supple. No muscular tenderness.      Right lower leg: No edema.      Left lower leg: No edema.   Skin:     General: Skin is warm and dry.      Capillary Refill: Capillary refill takes less than 2 seconds.      Findings: No bruising, erythema or rash.   Neurological:      General: No focal deficit present.      Mental Status: He is alert and oriented to person, place, and time.      Comments: Tremors noted         Laboratory:  Recent Labs     09/25/21  1033   WBC 6.0   RBC 3.93*   HEMOGLOBIN 12.8*   HEMATOCRIT 34.9*   MCV 88.8   MCH 32.6   MCHC 36.7*   RDW 43.8   PLATELETCT  130*   MPV 9.7     Recent Labs     09/25/21  1033   SODIUM 123*   POTASSIUM 3.3*   CHLORIDE 87*   CO2 20   GLUCOSE 169*   BUN 19   CREATININE 0.85   CALCIUM 9.2     Recent Labs     09/25/21  1033   ALTSGPT 54*   ASTSGOT 69*   ALKPHOSPHAT 88   TBILIRUBIN 1.0   LIPASE 548*   GLUCOSE 169*         No results for input(s): NTPROBNP in the last 72 hours.      No results for input(s): TROPONINT in the last 72 hours.    Imaging:  US-RUQ    (Results Pending)       EKG:  I have personally reviewed the images and compared with prior images.    Assessment/Plan:  I anticipate this patient will require at least two midnights for appropriate medical management, necessitating inpatient admission.    * Alcohol withdrawal syndrome without complication (HCC)- (present on admission)  Assessment & Plan  Last drink was 4 days ago  CIWA protocol in place   Aspiration, seizure, fall precautions in place  Detox bag in ED  Started on MV, thiamine and folic acid    Alcohol-induced acute pancreatitis without infection or necrosis  Assessment & Plan  Lipase of 548, transaminitis  CLD, IV fluids, antiemetics, and pain control    Hyponatremia  Assessment & Plan  Sodium of 123  IV fluids  Serial monitoring    Transaminitis- (present on admission)  Assessment & Plan  Secondary to alcohol use disorder  Hepatitis panel, HIV panel, right upper quadrant ultrasound ordered  Avoid any hepatotoxic agents    Hypokalemia- (present on admission)  Assessment & Plan  Mild  Monitor magnesium and replenish  Continue to monitor    Thrombocytopenia (HCC)- (present on admission)  Assessment & Plan  Chronic, likely secondary to alcohol use disorder  No active bleeding  Continue to monitor      VTE prophylaxis: SCDs/TEDs and enoxaparin ppx

## 2021-09-25 NOTE — ED TRIAGE NOTES
"Chief Complaint   Patient presents with   • ETOH Withdrawal     WED night last drink.     /76   Pulse (!) 124   Temp 37.2 °C (98.9 °F) (Temporal)   Resp 18   Ht 1.778 m (5' 10\")   Wt 95.3 kg (210 lb)   SpO2 98%   BMI 30.13 kg/m²     ETOH detox.  States last drink was Wednesday night and vomited thursday and Friday.      "

## 2021-09-26 ENCOUNTER — APPOINTMENT (OUTPATIENT)
Dept: RADIOLOGY | Facility: MEDICAL CENTER | Age: 56
DRG: 896 | End: 2021-09-26
Attending: GENERAL PRACTICE
Payer: COMMERCIAL

## 2021-09-26 PROBLEM — K81.0 ACUTE CHOLECYSTITIS: Status: ACTIVE | Noted: 2021-09-26

## 2021-09-26 PROBLEM — E83.39 HYPOPHOSPHATEMIA: Status: ACTIVE | Noted: 2021-09-26

## 2021-09-26 LAB
ANION GAP SERPL CALC-SCNC: 11 MMOL/L (ref 7–16)
BUN SERPL-MCNC: 12 MG/DL (ref 8–22)
CALCIUM SERPL-MCNC: 8.1 MG/DL (ref 8.4–10.2)
CHLORIDE SERPL-SCNC: 97 MMOL/L (ref 96–112)
CO2 SERPL-SCNC: 22 MMOL/L (ref 20–33)
CREAT SERPL-MCNC: 0.56 MG/DL (ref 0.5–1.4)
CREAT UR-MCNC: 238.07 MG/DL
GLUCOSE SERPL-MCNC: 88 MG/DL (ref 65–99)
MAGNESIUM SERPL-MCNC: 2 MG/DL (ref 1.5–2.5)
PHOSPHATE SERPL-MCNC: 1 MG/DL (ref 2.5–4.5)
POTASSIUM SERPL-SCNC: 3.4 MMOL/L (ref 3.6–5.5)
SODIUM SERPL-SCNC: 129 MMOL/L (ref 135–145)
SODIUM SERPL-SCNC: 130 MMOL/L (ref 135–145)
SODIUM UR-SCNC: <20 MMOL/L

## 2021-09-26 PROCEDURE — 83735 ASSAY OF MAGNESIUM: CPT

## 2021-09-26 PROCEDURE — 84295 ASSAY OF SERUM SODIUM: CPT

## 2021-09-26 PROCEDURE — 700102 HCHG RX REV CODE 250 W/ 637 OVERRIDE(OP): Performed by: GENERAL PRACTICE

## 2021-09-26 PROCEDURE — 770020 HCHG ROOM/CARE - TELE (206)

## 2021-09-26 PROCEDURE — 94760 N-INVAS EAR/PLS OXIMETRY 1: CPT

## 2021-09-26 PROCEDURE — 76705 ECHO EXAM OF ABDOMEN: CPT

## 2021-09-26 PROCEDURE — 700111 HCHG RX REV CODE 636 W/ 250 OVERRIDE (IP): Performed by: GENERAL PRACTICE

## 2021-09-26 PROCEDURE — 700105 HCHG RX REV CODE 258: Performed by: GENERAL PRACTICE

## 2021-09-26 PROCEDURE — 99233 SBSQ HOSP IP/OBS HIGH 50: CPT | Performed by: GENERAL PRACTICE

## 2021-09-26 PROCEDURE — A9270 NON-COVERED ITEM OR SERVICE: HCPCS | Performed by: GENERAL PRACTICE

## 2021-09-26 PROCEDURE — 700101 HCHG RX REV CODE 250: Performed by: GENERAL PRACTICE

## 2021-09-26 PROCEDURE — 84100 ASSAY OF PHOSPHORUS: CPT

## 2021-09-26 PROCEDURE — 36415 COLL VENOUS BLD VENIPUNCTURE: CPT

## 2021-09-26 PROCEDURE — 80048 BASIC METABOLIC PNL TOTAL CA: CPT

## 2021-09-26 RX ORDER — METRONIDAZOLE 500 MG/1
500 TABLET ORAL EVERY 8 HOURS
Status: DISCONTINUED | OUTPATIENT
Start: 2021-09-26 | End: 2021-09-27 | Stop reason: HOSPADM

## 2021-09-26 RX ORDER — POTASSIUM CHLORIDE 20 MEQ/1
40 TABLET, EXTENDED RELEASE ORAL ONCE
Status: COMPLETED | OUTPATIENT
Start: 2021-09-26 | End: 2021-09-26

## 2021-09-26 RX ADMIN — METRONIDAZOLE 500 MG: 500 TABLET ORAL at 21:45

## 2021-09-26 RX ADMIN — THIAMINE HCL TAB 100 MG 100 MG: 100 TAB at 06:21

## 2021-09-26 RX ADMIN — ENOXAPARIN SODIUM 40 MG: 40 INJECTION SUBCUTANEOUS at 06:21

## 2021-09-26 RX ADMIN — LORAZEPAM 0.5 MG: 0.5 TABLET ORAL at 10:53

## 2021-09-26 RX ADMIN — POTASSIUM CHLORIDE AND SODIUM CHLORIDE: 900; 150 INJECTION, SOLUTION INTRAVENOUS at 10:53

## 2021-09-26 RX ADMIN — LORAZEPAM 0.5 MG: 0.5 TABLET ORAL at 06:31

## 2021-09-26 RX ADMIN — METRONIDAZOLE 500 MG: 500 TABLET ORAL at 14:52

## 2021-09-26 RX ADMIN — FOLIC ACID 1 MG: 1 TABLET ORAL at 06:21

## 2021-09-26 RX ADMIN — POTASSIUM CHLORIDE AND SODIUM CHLORIDE 1000 ML: 900; 150 INJECTION, SOLUTION INTRAVENOUS at 20:45

## 2021-09-26 RX ADMIN — LORAZEPAM 0.5 MG: 0.5 TABLET ORAL at 21:45

## 2021-09-26 RX ADMIN — POTASSIUM CHLORIDE AND SODIUM CHLORIDE 1000 ML: 900; 150 INJECTION, SOLUTION INTRAVENOUS at 00:59

## 2021-09-26 RX ADMIN — POTASSIUM CHLORIDE 40 MEQ: 1500 TABLET, EXTENDED RELEASE ORAL at 09:14

## 2021-09-26 RX ADMIN — CEFTRIAXONE SODIUM 2 G: 2 INJECTION, POWDER, FOR SOLUTION INTRAMUSCULAR; INTRAVENOUS at 09:14

## 2021-09-26 RX ADMIN — DIBASIC SODIUM PHOSPHATE, MONOBASIC POTASSIUM PHOSPHATE AND MONOBASIC SODIUM PHOSPHATE 500 MG: 852; 155; 130 TABLET ORAL at 11:02

## 2021-09-26 RX ADMIN — LORAZEPAM 1 MG: 1 TABLET ORAL at 01:23

## 2021-09-26 RX ADMIN — METRONIDAZOLE 500 MG: 500 TABLET ORAL at 10:53

## 2021-09-26 RX ADMIN — LOPERAMIDE HYDROCHLORIDE 2 MG: 2 CAPSULE ORAL at 20:56

## 2021-09-26 RX ADMIN — DIBASIC SODIUM PHOSPHATE, MONOBASIC POTASSIUM PHOSPHATE AND MONOBASIC SODIUM PHOSPHATE 500 MG: 852; 155; 130 TABLET ORAL at 17:06

## 2021-09-26 RX ADMIN — THERA TABS 1 TABLET: TAB at 06:21

## 2021-09-26 ASSESSMENT — PAIN DESCRIPTION - PAIN TYPE
TYPE: ACUTE PAIN

## 2021-09-26 ASSESSMENT — LIFESTYLE VARIABLES
AUDITORY DISTURBANCES: NOT PRESENT
AGITATION: SOMEWHAT MORE THAN NORMAL ACTIVITY
NAUSEA AND VOMITING: MILD NAUSEA WITH NO VOMITING
VISUAL DISTURBANCES: NOT PRESENT
NAUSEA AND VOMITING: MILD NAUSEA WITH NO VOMITING
ORIENTATION AND CLOUDING OF SENSORIUM: ORIENTED AND CAN DO SERIAL ADDITIONS
HEADACHE, FULLNESS IN HEAD: VERY MILD
ORIENTATION AND CLOUDING OF SENSORIUM: ORIENTED AND CAN DO SERIAL ADDITIONS
NAUSEA AND VOMITING: MILD NAUSEA WITH NO VOMITING
TREMOR: TREMOR NOT VISIBLE BUT CAN BE FELT, FINGERTIP TO FINGERTIP
AUDITORY DISTURBANCES: NOT PRESENT
TOTAL SCORE: 5
TREMOR: TREMOR NOT VISIBLE BUT CAN BE FELT, FINGERTIP TO FINGERTIP
VISUAL DISTURBANCES: NOT PRESENT
VISUAL DISTURBANCES: NOT PRESENT
TREMOR: TREMOR NOT VISIBLE BUT CAN BE FELT, FINGERTIP TO FINGERTIP
PAROXYSMAL SWEATS: NO SWEAT VISIBLE
NAUSEA AND VOMITING: MILD NAUSEA WITH NO VOMITING
VISUAL DISTURBANCES: NOT PRESENT
HEADACHE, FULLNESS IN HEAD: VERY MILD
ORIENTATION AND CLOUDING OF SENSORIUM: ORIENTED AND CAN DO SERIAL ADDITIONS
TOTAL SCORE: 8
AUDITORY DISTURBANCES: NOT PRESENT
NAUSEA AND VOMITING: NO NAUSEA AND NO VOMITING
ANXIETY: *
TREMOR: TREMOR NOT VISIBLE BUT CAN BE FELT, FINGERTIP TO FINGERTIP
HEADACHE, FULLNESS IN HEAD: VERY MILD
PAROXYSMAL SWEATS: *
ORIENTATION AND CLOUDING OF SENSORIUM: ORIENTED AND CAN DO SERIAL ADDITIONS
ANXIETY: *
VISUAL DISTURBANCES: NOT PRESENT
AGITATION: NORMAL ACTIVITY
TOTAL SCORE: 3
AUDITORY DISTURBANCES: NOT PRESENT
HEADACHE, FULLNESS IN HEAD: NOT PRESENT
TREMOR: TREMOR NOT VISIBLE BUT CAN BE FELT, FINGERTIP TO FINGERTIP
ANXIETY: *
TOTAL SCORE: 5
ANXIETY: MILDLY ANXIOUS
AGITATION: NORMAL ACTIVITY
PAROXYSMAL SWEATS: BARELY PERCEPTIBLE SWEATING. PALMS MOIST
TOTAL SCORE: 7
PAROXYSMAL SWEATS: BARELY PERCEPTIBLE SWEATING. PALMS MOIST
ORIENTATION AND CLOUDING OF SENSORIUM: ORIENTED AND CAN DO SERIAL ADDITIONS
AGITATION: NORMAL ACTIVITY
AUDITORY DISTURBANCES: NOT PRESENT
HEADACHE, FULLNESS IN HEAD: NOT PRESENT
AGITATION: SOMEWHAT MORE THAN NORMAL ACTIVITY
PAROXYSMAL SWEATS: BARELY PERCEPTIBLE SWEATING. PALMS MOIST
ANXIETY: *

## 2021-09-26 ASSESSMENT — ENCOUNTER SYMPTOMS: ABDOMINAL PAIN: 1

## 2021-09-26 NOTE — PROGRESS NOTES
Telemetry Shift Summary    Rhythm SR-ST  HR Range 80s-100s  Ectopy R PVC  Measurements 0.16/0.08/0.40        Normal Values  Rhythm SR  HR Range    Measurements 0.12-0.20 / 0.06-0.10  / 0.30-0.52

## 2021-09-26 NOTE — HOSPITAL COURSE
Mc Sosa is a 56 y.o. male with PMHx of alcohol use disorder, insomnia, and obesity who presented 9/25/2021 with abdominal pain, nausea and vomiting.     Patient reports he used to drink heavily a couple years ago quit for 2 years and attended AA, however in light of the recent pandemic patient has started to drink again.  Patient reports he is motivated to quit drinking and would like to attend AA meetings again.     Patient reports he has been on a 21-day binge of drinking vodka daily, up to 4 pints.  Last drink was on 9/22/2021. He denies any prior history of seizure-like activity, withdrawal symptoms, or intubations. CIWA on admission was 12.     Started on IVF, CIWA protocol, CLD, will ADAT.  Labs significant for thrombocytopenia, hyponatremia, hypokalemia, transaminitis, and pancreatitis.     All patient's electrolyte abnormalities have normalized.  Last CIWA was 4.  Patient is tolerating a liquid diet.  He will follow up with surgery outpatient to have his gallbladder removed, no indication to have it removed during this admission.  He will continue with p.o. antibiotics.

## 2021-09-26 NOTE — PROGRESS NOTES
Report received from night shift RN. Assume care. Pt. AAOx4 pt is bed,  Assessment completed. VSS. Denies pain, good CMS and pulses to gerardo LE, denies numbness and tingling.  Pt was update for the care for the day. White board updated, All question answered. Pt has call light within reach,  bed is in the lowest position. Pt has no other needs at this time.   Covid 19 surge in effect

## 2021-09-26 NOTE — PROGRESS NOTES
[facility] Medicine Daily Progress Note    Date of Service  9/26/2021    Chief Complaint  Mc Sosa is a 56 y.o. male admitted 9/25/2021 with abdominal pain    Hospital Course  Mc Sosa is a 56 y.o. male with PMHx of alcohol use disorder, insomnia, and obesity who presented 9/25/2021 with abdominal pain, nausea and vomiting.     Patient reports he used to drink heavily a couple years ago quit for 2 years and attended AA, however in light of the recent pandemic patient has started to drink again.  Patient reports he is motivated to quit drinking and would like to attend AA meetings again.     Patient reports he has been on a 21-day binge of drinking vodka daily, up to 4 pints.  Last drink was on 9/22/2021. He denies any prior history of seizure-like activity, withdrawal symptoms, or intubations. CIWA on admission was 12.     Started on IVF, CIWA protocol, CLD, will ADAT.  Labs significant for thrombocytopenia, hyponatremia, hypokalemia, transaminitis, and pancreatitis.  We will continue to monitor serial sodiums.     Interval Problem Update  Patient reports he continues to have some abdominal pain after eating. RUQ US noted acute cholecystitis, positive Coley sign on exam.  I discussed the case with general surgery, they do not recommend any surgery at this time, patient to continue with IV antibiotics and can follow-up with him outpatient for removal.    We will keep patient n.p.o. and advance diet as tolerated.  Continue with IV fluids.    Sodium is improving.  Electrolytes replenished.    Last CIWA was 7, continue with CIWA protocol.    I have personally seen and examined the patient at bedside. I discussed the plan of care with patient, bedside RN and charge RN.    Consultants/Specialty  None    Code Status  Full Code    Disposition  Patient is not medically cleared.   Anticipate discharge to to home with close outpatient follow-up.  I have placed the appropriate orders for  post-discharge needs.    Review of Systems  Review of Systems   Gastrointestinal: Positive for abdominal pain.   All other systems reviewed and are negative.       Physical Exam  Temp:  [36.6 °C (97.9 °F)-37.2 °C (99 °F)] 37.2 °C (99 °F)  Pulse:  [] 90  Resp:  [17-20] 17  BP: ()/(62-84) 131/72  SpO2:  [95 %-100 %] 98 %    Physical Exam  Vitals and nursing note reviewed.   Constitutional:       General: He is not in acute distress.     Appearance: Normal appearance.   HENT:      Head: Normocephalic and atraumatic.   Eyes:      Extraocular Movements: Extraocular movements intact.      Conjunctiva/sclera: Conjunctivae normal.      Pupils: Pupils are equal, round, and reactive to light.   Cardiovascular:      Rate and Rhythm: Normal rate and regular rhythm.      Pulses: Normal pulses.      Heart sounds: No murmur heard.   No friction rub. No gallop.    Pulmonary:      Effort: Pulmonary effort is normal. No respiratory distress.      Breath sounds: Normal breath sounds. No wheezing, rhonchi or rales.   Abdominal:      General: Bowel sounds are normal. There is no distension.      Palpations: Abdomen is soft.      Tenderness: There is abdominal tenderness (RUQ + Coley's).   Musculoskeletal:         General: No swelling or tenderness. Normal range of motion.      Cervical back: Normal range of motion and neck supple. No muscular tenderness.      Right lower leg: No edema.      Left lower leg: No edema.   Skin:     General: Skin is warm and dry.      Capillary Refill: Capillary refill takes less than 2 seconds.      Findings: No bruising, erythema or rash.   Neurological:      General: No focal deficit present.      Mental Status: He is alert and oriented to person, place, and time.         Fluids    Intake/Output Summary (Last 24 hours) at 9/26/2021 0964  Last data filed at 9/26/2021 0817  Gross per 24 hour   Intake 240 ml   Output 300 ml   Net -60 ml       Laboratory  Recent Labs     09/25/21  1033   WBC 6.0    RBC 3.93*   HEMOGLOBIN 12.8*   HEMATOCRIT 34.9*   MCV 88.8   MCH 32.6   MCHC 36.7*   RDW 43.8   PLATELETCT 130*   MPV 9.7     Recent Labs     09/25/21  1033 09/25/21  1033 09/25/21  1616 09/25/21  2227 09/26/21  0440   SODIUM 123*   < > 130* 128* 130*  129*   POTASSIUM 3.3*  --   --   --  3.4*   CHLORIDE 87*  --   --   --  97   CO2 20  --   --   --  22   GLUCOSE 169*  --   --   --  88   BUN 19  --   --   --  12   CREATININE 0.85  --   --   --  0.56   CALCIUM 9.2  --   --   --  8.1*    < > = values in this interval not displayed.                   Imaging  US-RUQ   Final Result      Dilated gallbladder with sludge and a positive sonographic Coley's sign. Correlate for acute cholecystitis.           Assessment/Plan  * Alcohol withdrawal syndrome without complication (HCC)- (present on admission)  Assessment & Plan  Last drink was 4 days ago  CIWA protocol in place   Aspiration, seizure, fall precautions in place  Detox bag in ED  Started on MV, thiamine and folic acid    Acute cholecystitis- (present on admission)  Assessment & Plan  Noted on RUQ US  Discussed case with general surgery, they recommend IV antibiotics at this time and he can follow-up with them outpatient for surgical removal   Started patient on Rocephin and Flagyl    Alcohol-induced acute pancreatitis without infection or necrosis  Assessment & Plan  Lipase of 548, transaminitis  CLD, IV fluids, antiemetics, and pain control    Hyponatremia  Assessment & Plan  Sodium of 123  IV fluids  Serial monitoring   Improving    Hypophosphatemia  Assessment & Plan  Mild  Continue to monitor mag, potassium, and phosphorus   Replenish      Transaminitis- (present on admission)  Assessment & Plan  Secondary to alcohol use disorder  Hepatitis panel and HIV panel negative, RUQ with no evidence of cirrhosis  Avoid any hepatotoxic agents    Hypokalemia- (present on admission)  Assessment & Plan  Mild  Monitor magnesium and replenish  Continue to  monitor    Thrombocytopenia (HCC)- (present on admission)  Assessment & Plan  Chronic, likely secondary to alcohol use disorder  No active bleeding  Continue to monitor       VTE prophylaxis: SCDs/TEDs and enoxaparin ppx    I have performed a physical exam and reviewed and updated ROS and Plan today (9/26/2021). In review of yesterday's note (9/25/2021), there are no changes except as documented above.

## 2021-09-26 NOTE — ASSESSMENT & PLAN NOTE
Noted on RUQ US  Discussed case with general surgery, they recommend IV antibiotics at this time and he can follow-up with them outpatient for surgical removal   Started patient on Rocephin and Flagyl

## 2021-09-27 VITALS
HEIGHT: 70 IN | DIASTOLIC BLOOD PRESSURE: 63 MMHG | RESPIRATION RATE: 18 BRPM | OXYGEN SATURATION: 99 % | TEMPERATURE: 98.4 F | HEART RATE: 95 BPM | SYSTOLIC BLOOD PRESSURE: 113 MMHG | WEIGHT: 199.3 LBS | BODY MASS INDEX: 28.53 KG/M2

## 2021-09-27 LAB
ANION GAP SERPL CALC-SCNC: 5 MMOL/L (ref 7–16)
BUN SERPL-MCNC: 8 MG/DL (ref 8–22)
CALCIUM SERPL-MCNC: 8.3 MG/DL (ref 8.4–10.2)
CHLORIDE SERPL-SCNC: 105 MMOL/L (ref 96–112)
CO2 SERPL-SCNC: 25 MMOL/L (ref 20–33)
CREAT SERPL-MCNC: 0.61 MG/DL (ref 0.5–1.4)
GLUCOSE SERPL-MCNC: 97 MG/DL (ref 65–99)
MAGNESIUM SERPL-MCNC: 1.8 MG/DL (ref 1.5–2.5)
POTASSIUM SERPL-SCNC: 3.6 MMOL/L (ref 3.6–5.5)
SODIUM SERPL-SCNC: 135 MMOL/L (ref 135–145)

## 2021-09-27 PROCEDURE — 99239 HOSP IP/OBS DSCHRG MGMT >30: CPT | Performed by: GENERAL PRACTICE

## 2021-09-27 PROCEDURE — 36415 COLL VENOUS BLD VENIPUNCTURE: CPT

## 2021-09-27 PROCEDURE — 83735 ASSAY OF MAGNESIUM: CPT

## 2021-09-27 PROCEDURE — A9270 NON-COVERED ITEM OR SERVICE: HCPCS | Performed by: GENERAL PRACTICE

## 2021-09-27 PROCEDURE — 80048 BASIC METABOLIC PNL TOTAL CA: CPT

## 2021-09-27 PROCEDURE — 700102 HCHG RX REV CODE 250 W/ 637 OVERRIDE(OP): Performed by: GENERAL PRACTICE

## 2021-09-27 PROCEDURE — 700105 HCHG RX REV CODE 258: Performed by: GENERAL PRACTICE

## 2021-09-27 PROCEDURE — 700111 HCHG RX REV CODE 636 W/ 250 OVERRIDE (IP): Performed by: GENERAL PRACTICE

## 2021-09-27 RX ORDER — LANOLIN ALCOHOL/MO/W.PET/CERES
100 CREAM (GRAM) TOPICAL DAILY
Qty: 90 TABLET | Refills: 0 | Status: SHIPPED | OUTPATIENT
Start: 2021-09-28 | End: 2021-09-30

## 2021-09-27 RX ORDER — METRONIDAZOLE 500 MG/1
500 TABLET ORAL EVERY 8 HOURS
Qty: 18 TABLET | Refills: 0 | Status: SHIPPED | OUTPATIENT
Start: 2021-09-27 | End: 2021-10-03

## 2021-09-27 RX ORDER — FOLIC ACID 1 MG/1
1 TABLET ORAL DAILY
Qty: 90 TABLET | Refills: 0 | Status: SHIPPED | OUTPATIENT
Start: 2021-09-28 | End: 2021-09-30

## 2021-09-27 RX ORDER — CIPROFLOXACIN 500 MG/1
500 TABLET, FILM COATED ORAL 2 TIMES DAILY
Qty: 12 TABLET | Refills: 0 | Status: SHIPPED | OUTPATIENT
Start: 2021-09-27 | End: 2021-10-03

## 2021-09-27 RX ADMIN — CEFTRIAXONE SODIUM 2 G: 2 INJECTION, POWDER, FOR SOLUTION INTRAMUSCULAR; INTRAVENOUS at 06:02

## 2021-09-27 RX ADMIN — THIAMINE HCL TAB 100 MG 100 MG: 100 TAB at 06:09

## 2021-09-27 RX ADMIN — DIBASIC SODIUM PHOSPHATE, MONOBASIC POTASSIUM PHOSPHATE AND MONOBASIC SODIUM PHOSPHATE 500 MG: 852; 155; 130 TABLET ORAL at 06:08

## 2021-09-27 RX ADMIN — LORAZEPAM 0.5 MG: 0.5 TABLET ORAL at 01:49

## 2021-09-27 RX ADMIN — THERA TABS 1 TABLET: TAB at 06:09

## 2021-09-27 RX ADMIN — METRONIDAZOLE 500 MG: 500 TABLET ORAL at 06:09

## 2021-09-27 RX ADMIN — METRONIDAZOLE 500 MG: 500 TABLET ORAL at 13:37

## 2021-09-27 RX ADMIN — FOLIC ACID 1 MG: 1 TABLET ORAL at 06:08

## 2021-09-27 RX ADMIN — ENOXAPARIN SODIUM 40 MG: 40 INJECTION SUBCUTANEOUS at 06:07

## 2021-09-27 NOTE — DISCHARGE PLANNING
Care Transition Team Assessment    Information Source  Orientation Level: Oriented X4  Information Given By: Patient  Informant's Name: Abiel Sosa  Who is responsible for making decisions for patient? : Patient    Readmission Evaluation  Is this a readmission?: Yes - unplanned readmission  Why do you think you were readmitted?: Drinking acohol  Was an appointment arranged for you prior to discharge?: No  Were there new prescriptions you were supposed to fill after you were discharged?: No  Did you understand your discharge instructions?: Yes  Did you have enough support after your last discharge?: Yes    Elopement Risk  Legal Hold: No         Discharge Preparedness  What is your plan after discharge?: Home with help  What are your discharge supports?: Partner  Prior Functional Level: Ambulatory, Independent with Activities of Daily Living, Drives Self, Independent with Medication Management  Difficulity with ADLs: None  Difficulity with IADLs: None    Functional Assesment  Prior Functional Level: Ambulatory, Independent with Activities of Daily Living, Drives Self, Independent with Medication Management    Finances  Financial Barriers to Discharge: No  Prescription Coverage: Yes              Advance Directive  Advance Directive?: None  Advance Directive offered?: AD Booklet refused    Domestic Abuse  Have you ever been the victim of abuse or violence?: No    Psychological Assessment  History of Substance Abuse: Alcohol  Date Last Used - Alcohol: 9/24/21  Substance Abuse Comments: Patient drinks daily - 1 pint of vodka  History of Psychiatric Problems: Yes  Non-compliant with Treatment: Yes  Newly Diagnosed Illness: No    Discharge Risks or Barriers  Discharge risks or barriers?: No PCP  Patient risk factors: Readmission, No PCP    Anticipated Discharge Information  Discharge Disposition: Discharged to home/self care (01)  Discharge Address: [address] [place] [address], [place] [id number]  Discharge Contact Phone Number:  "[phone]      Care Transition Team Discharge Planning    Anticipated Discharge Disposition: DC home.    Action: Lsw met with patient to complete CTT assessment. Patient presented A&Ox4 e/b being able to confirm the facesheet as being accurate. Patient reported that his ride will pick him up at 1530. Patient was offered resource information on chemical dependency. He declined the offer and stated, \"I was sober for three years).   He also stated that he was independent with ADLs and IADLs.     Barriers to Discharge: None    Plan: Lsw will assist medical team with DC planning.  "

## 2021-09-27 NOTE — DISCHARGE INSTRUCTIONS
Alcohol Use Disorder  Alcohol use disorder is when your drinking disrupts your daily life. When you have this condition, you drink too much alcohol and you cannot control your drinking.  Alcohol use disorder can cause serious problems with your physical health. It can affect your brain, heart, liver, pancreas, immune system, stomach, and intestines. Alcohol use disorder can increase your risk for certain cancers and cause problems with your mental health, such as depression, anxiety, psychosis, delirium, and dementia. People with this disorder risk hurting themselves and others.  What are the causes?  This condition is caused by drinking too much alcohol over time. It is not caused by drinking too much alcohol only one or two times. Some people with this condition drink alcohol to cope with or escape from negative life events. Others drink to relieve pain or symptoms of mental illness.  What increases the risk?  You are more likely to develop this condition if:  · You have a family history of alcohol use disorder.  · Your culture encourages drinking to the point of intoxication, or makes alcohol easy to get.  · You had a mood or conduct disorder in childhood.  · You have been a victim of abuse.  · You are an adolescent and:  ? You have poor grades or difficulties in school.  ? Your caregivers do not talk to you about saying no to alcohol, or supervise your activities.  ? You are impulsive or you have trouble with self-control.  What are the signs or symptoms?  Symptoms of this condition include:  · Drinking more than you want to.  · Drinking for longer than you want to.  · Trying several times to drink less or to control your drinking.  · Spending a lot of time getting alcohol, drinking, or recovering from drinking.  · Craving alcohol.  · Having problems at work, at school, or at home due to drinking.  · Having problems in relationships due to drinking.  · Drinking when it is dangerous to drink, such  as before driving a car.  · Continuing to drink even though you know you might have a physical or mental problem related to drinking.  · Needing more and more alcohol to get the same effect you want from the alcohol (building up tolerance).  · Having symptoms of withdrawal when you stop drinking. Symptoms of withdrawal include:  ? Fatigue.  ? Nightmares.  ? Trouble sleeping.  ? Depression.  ? Anxiety.  ? Fever.  ? Seizures.  ? Severe confusion.  ? Feeling or seeing things that are not there (hallucinations).  ? Tremors.  ? Rapid heart rate.  ? Rapid breathing.  ? High blood pressure.  · Drinking to avoid symptoms of withdrawal.  How is this diagnosed?  This condition is diagnosed with an assessment. Your health care provider may start the assessment by asking three or four questions about your drinking.  Your health care provider may perform a physical exam or do lab tests to see if you have physical problems resulting from alcohol use. She or he may refer you to a mental health professional for evaluation.  How is this treated?  Some people with alcohol use disorder are able to reduce their alcohol use to low-risk levels. Others need to completely quit drinking alcohol. When necessary, mental health professionals with specialized training in substance use treatment can help. Your health care provider can help you decide how severe your alcohol use disorder is and what type of treatment you need. The following forms of treatment are available:  · Detoxification. Detoxification involves quitting drinking and using prescription medicines within the first week to help lessen withdrawal symptoms. This treatment is important for people who have had withdrawal symptoms before and for heavy drinkers who are likely to have withdrawal symptoms. Alcohol withdrawal can be dangerous, and in severe cases, it can cause death. Detoxification may be provided in a home, community, or primary care setting, or in a hospital or  substance use treatment facility.  · Counseling. This treatment is also called talk therapy. It is provided by substance use treatment counselors. A counselor can address the reasons you use alcohol and suggest ways to keep you from drinking again or to prevent problem drinking. The goals of talk therapy are to:  ? Find healthy activities and ways for you to cope with stress.  ? Identify and avoid the things that trigger your alcohol use.  ? Help you learn how to handle cravings.  · Medicines. Medicines can help treat alcohol use disorder by:  ? Decreasing alcohol cravings.  ? Decreasing the positive feeling you have when you drink alcohol.  ? Causing an uncomfortable physical reaction when you drink alcohol (aversion therapy).  · Support groups. Support groups are led by people who have quit drinking. They provide emotional support, advice, and guidance.  These forms of treatment are often combined. Some people with this condition benefit from a combination of treatments provided by specialized substance use treatment centers.  Follow these instructions at home:  · Take over-the-counter and prescription medicines only as told by your health care provider.  · Check with your health care provider before starting any new medicines.  · Ask friends and family members not to offer you alcohol.  · Avoid situations where alcohol is served, including gatherings where others are drinking alcohol.  · Create a plan for what to do when you are tempted to use alcohol.  · Find hobbies or activities that you enjoy that do not include alcohol.  · Keep all follow-up visits as told by your health care provider. This is important.  How is this prevented?  · If you drink, limit alcohol intake to no more than 1 drink a day for nonpregnant women and 2 drinks a day for men. One drink equals 12 oz of beer, 5 oz of wine, or 1½ oz of hard liquor.  · If you have a mental health condition, get treatment and support.  · Do not give alcohol to  adolescents.  · If you are an adolescent:  ? Do not drink alcohol.  ? Do not be afraid to say no if someone offers you alcohol. Speak up about why you do not want to drink. You can be a positive role model for your friends and set a good example for those around you by not drinking alcohol.  ? If your friends drink, spend time with others who do not drink alcohol. Make new friends who do not use alcohol.  ? Find healthy ways to manage stress and emotions, such as meditation or deep breathing, exercise, spending time in nature, listening to music, or talking with a trusted friend or family member.  Contact a health care provider if:  · You are not able to take your medicines as told.  · Your symptoms get worse.  · You return to drinking alcohol (relapse) and your symptoms get worse.  Get help right away if:  · You have thoughts about hurting yourself or others.  If you ever feel like you may hurt yourself or others, or have thoughts about taking your own life, get help right away. You can go to your nearest emergency department or call:  · Your local emergency services (911 in the [place]  · A suicide crisis helpline, such as the National Suicide Prevention Lifeline at 1-505.121.1901. This is open 24 hours a day.  Summary  · Alcohol use disorder is when your drinking disrupts your daily life. When you have this condition, you drink too much alcohol and you cannot control your drinking.  · Treatment may include detoxification, counseling, medicine, and support groups.  · Ask friends and family members not to offer you alcohol. Avoid situations where alcohol is served.  · Get help right away if you have thoughts about hurting yourself or others.  This information is not intended to replace advice given to you by your health care provider. Make sure you discuss any questions you have with your health care provider.  Document Released: 01/25/2006 Document Revised: 11/30/2018 Document Reviewed: 09/14/2017  Elsevier Patient  Education © 2020 [employer]  Discharge Instructions    Discharged to home by car with friend. Discharged via wheelchair, hospital escort: Yes.  Special equipment needed: Not Applicable    Be sure to schedule a follow-up appointment with your primary care doctor or any specialists as instructed.     Discharge Plan:   Diet Plan: Discussed  Activity Level: Discussed  Confirmed Follow up Appointment: Patient to Call and Schedule Appointment  Confirmed Symptoms Management: Discussed  Medication Reconciliation Updated: Yes  Influenza Vaccine Indication: Patient Refuses    I understand that a diet low in cholesterol, fat, and sodium is recommended for good health. Unless I have been given specific instructions below for another diet, I accept this instruction as my diet prescription.   Other diet: Full deit    Special Instructions: None    · Is patient discharged on Warfarin / Coumadin?   No     Depression / Suicide Risk    As [facility], it is important to learn how to keep safe from harming yourself.    Recognize the warning signs:  · Abrupt changes in personality, positive or negative- including increase in energy   · Giving away possessions  · Change in eating patterns- significant weight changes-  positive or negative  · Change in sleeping patterns- unable to sleep or sleeping all the time   · Unwillingness or inability to communicate  · Depression  · Unusual sadness, discouragement and loneliness  · Talk of wanting to die  · Neglect of personal appearance   · Rebelliousness- reckless behavior  · Withdrawal from people/activities they love  · Confusion- inability to concentrate     If you or a loved one observes any of these behaviors or has concerns about self-harm, here's what you can do:  · Talk about it- your feelings and reasons for harming yourself  · Remove any means that you might use to hurt yourself (examples: pills, rope, extension cords, firearm)  · Get  professional help from the community (Mental Health, Substance Abuse, psychological counseling)  · Do not be alone:Call your Safe Contact- someone whom you trust who will be there for you.  · Call your local CRISIS HOTLINE 109-0209 or 355-639-8867  · Call your local Children's Mobile Crisis Response Team [place] (890) 083-7693 or www.51edj  · Call the toll free National Suicide Prevention Hotlines   · National Suicide Prevention Lifeline 707-802-SIXK (0824)  · [facility] Line Network 800-SUICIDE (236-5206)            Please continue taking ciprofloxacin 500 mg twice a day and Flagyl 500 mg 3 times a day for the next 6 days.  Please call [facility],[phone] to arrange for an appointment outpatient, to have your gallbladder removed.  In terms of your diet please maintain on a liquid/soft diet, refrain from any greasy or fatty foods as this will cause her gallbladder to go into overdrive and cause more pain.  If you develop any more fever or chills worsening abdominal pain please return to the emergency room.  Please take care and be well!

## 2021-09-27 NOTE — PROGRESS NOTES
Discharging patient home per MD orders. Patient demonstrated understanding of discharge instructions and educational materials, as well as, follow up appointments, medications, and prescriptions. Patient is voiding without difficulty. Patient denies pain. O2 is greater than 90%. All questions have been answered. Patient has been discharged off the unit with a hospital escort

## 2021-09-27 NOTE — PROGRESS NOTES
Report received from night shift RN. Assume care. Pt. AAOx4 pt is bed,  Assessment completed. VSS. Denies pain, no s/sx of etoh detox at this time. Pt slightly anxious about DC POC.  Pt was update for the care for the day. White board updated, All question answered. Pt has call light within reach,  bed is in the lowest position. Pt has no other needs at this time.   Pt to DC later this afternoon. Cab voucher available if needed  Covid 19 surge in place

## 2021-09-27 NOTE — DISCHARGE SUMMARY
Discharge Summary    CHIEF COMPLAINT ON ADMISSION  Chief Complaint   Patient presents with   • ETOH Withdrawal     WED night last drink.         Reason for Admission  VOMITING, DETOXING FROM ALCOHOL     Admission Date  9/25/2021    CODE STATUS  Full Code    HPI & HOSPITAL COURSE  Mc Sosa is a 56 y.o. male with PMHx of alcohol use disorder, insomnia, and obesity who presented 9/25/2021 with abdominal pain, nausea and vomiting.     Patient reports he used to drink heavily a couple years ago quit for 2 years and attended AA, however in light of the recent pandemic patient has started to drink again.  Patient reports he is motivated to quit drinking and would like to attend AA meetings again.     Patient reports he has been on a 21-day binge of drinking vodka daily, up to 4 pints.  Last drink was on 9/22/2021. He denies any prior history of seizure-like activity, withdrawal symptoms, or intubations. CIWA on admission was 12.     Started on IVF, CIWA protocol, CLD, will ADAT.  Labs significant for thrombocytopenia, hyponatremia, hypokalemia, transaminitis, and pancreatitis.     All patient's electrolyte abnormalities have normalized.  Last CIWA was 4.  Patient is tolerating a liquid diet.  He will follow up with surgery outpatient to have his gallbladder removed, no indication to have it removed during this admission.  He will continue with p.o. antibiotics.    Therefore, he is discharged in good and stable condition to home with close outpatient follow-up.    The patient met 2-midnight criteria for an inpatient stay at the time of discharge.    Discharge Date  09/27/2021    FOLLOW UP ITEMS POST DISCHARGE  General surgery  Primary care physician    DISCHARGE DIAGNOSES  Principal Problem:    Alcohol withdrawal syndrome without complication (HCC) POA: Yes  Active Problems:    Hyponatremia POA: Unknown    Alcohol-induced acute pancreatitis without infection or necrosis POA: Unknown    Acute cholecystitis POA:  Yes    Thrombocytopenia (HCC) (Chronic) POA: Yes    Hypokalemia POA: Yes    Transaminitis POA: Yes    Hypophosphatemia POA: No  Resolved Problems:    * No resolved hospital problems. *      FOLLOW UP  [facility]  [address]  [place] [address]  [phone]  In 2 weeks        MEDICATIONS ON DISCHARGE     Medication List      START taking these medications      Instructions   ciprofloxacin 500 MG Tabs  Commonly known as: CIPRO   Take 1 Tablet by mouth 2 times a day for 6 days.  Dose: 500 mg     folic acid 1 MG Tabs  Start taking on: September 28, 2021  Commonly known as: FOLVITE   Take 1 Tablet by mouth every day for 90 days.  Dose: 1 mg     metroNIDAZOLE 500 MG Tabs  Commonly known as: FLAGYL   Take 1 Tablet by mouth every 8 hours for 6 days.  Dose: 500 mg     multivitamin Tabs  Start taking on: September 28, 2021   Take 1 Tablet by mouth every day for 90 days.  Dose: 1 Tablet     thiamine 100 MG tablet  Start taking on: September 28, 2021  Commonly known as: THIAMINE   Take 1 Tablet by mouth every day for 90 days.  Dose: 100 mg            Allergies  No Known Allergies    DIET  Orders Placed This Encounter   Procedures   • Diet Order Diet: Full Liquid     Standing Status:   Standing     Number of Occurrences:   1     Order Specific Question:   Diet:     Answer:   Full Liquid [11]       ACTIVITY  As tolerated.  Weight bearing as tolerated    CONSULTATIONS  General surgery    PROCEDURES  None    LABORATORY  Lab Results   Component Value Date    SODIUM 135 09/27/2021    POTASSIUM 3.6 09/27/2021    CHLORIDE 105 09/27/2021    CO2 25 09/27/2021    GLUCOSE 97 09/27/2021    BUN 8 09/27/2021    CREATININE 0.61 09/27/2021        Lab Results   Component Value Date    WBC 6.0 09/25/2021    HEMOGLOBIN 12.8 (L) 09/25/2021    HEMATOCRIT 34.9 (L) 09/25/2021    PLATELETCT 130 (L) 09/25/2021      US-RUQ   Final Result      Dilated gallbladder with sludge and a positive sonographic Coley's sign.  Correlate for acute cholecystitis.        Total time of the discharge process exceeds 45 minutes.

## 2021-09-27 NOTE — PROGRESS NOTES
Telemetry Shift Summary     Rhythm SR-ST  HR Range   Ectopy R-PVC  Measurements 0.16/0.16/0.36           Normal Values  Rhythm SR  HR Range    Measurements 0.12-0.20 / 0.06-0.10  / 0.30-0.52

## 2021-09-27 NOTE — PROGRESS NOTES
Telemetry Shift Summary    Rhythm SR-ST  HR Range   Ectopy R PVCs  Measurements .14/.08/.36        Normal Values  Rhythm SR  HR Range    Measurements 0.12-0.20 / 0.06-0.10  / 0.30-0.52

## 2021-09-28 NOTE — PROGRESS NOTES
Telemetry Shift Summary    Rhythm SR-ST  HR Range   Ectopy R PVC/PACs  Measurements .16/.10/.34        Normal Values  Rhythm SR  HR Range    Measurements 0.12-0.20 / 0.06-0.10  / 0.30-0.52

## 2021-09-29 ENCOUNTER — TELEPHONE (OUTPATIENT)
Dept: MEDICAL GROUP | Facility: MEDICAL CENTER | Age: 56
End: 2021-09-29

## 2021-09-29 NOTE — TELEPHONE ENCOUNTER
Phone Number Called: [phone] (home) [phone] (work)      Call outcome: Left detailed message for patient. Informed to call back with any additional questions.    Message: LVM to re kate missed appt THR

## 2021-09-30 ENCOUNTER — ANESTHESIA EVENT (OUTPATIENT)
Dept: SURGERY | Facility: MEDICAL CENTER | Age: 56
End: 2021-09-30
Payer: COMMERCIAL

## 2021-09-30 ENCOUNTER — HOSPITAL ENCOUNTER (EMERGENCY)
Facility: MEDICAL CENTER | Age: 56
End: 2021-09-30
Attending: EMERGENCY MEDICINE | Admitting: SURGERY
Payer: COMMERCIAL

## 2021-09-30 ENCOUNTER — APPOINTMENT (OUTPATIENT)
Dept: RADIOLOGY | Facility: MEDICAL CENTER | Age: 56
End: 2021-09-30
Attending: EMERGENCY MEDICINE
Payer: COMMERCIAL

## 2021-09-30 ENCOUNTER — ANESTHESIA (OUTPATIENT)
Dept: SURGERY | Facility: MEDICAL CENTER | Age: 56
End: 2021-09-30
Payer: COMMERCIAL

## 2021-09-30 VITALS
HEART RATE: 73 BPM | SYSTOLIC BLOOD PRESSURE: 123 MMHG | DIASTOLIC BLOOD PRESSURE: 78 MMHG | RESPIRATION RATE: 16 BRPM | OXYGEN SATURATION: 98 % | BODY MASS INDEX: 28.25 KG/M2 | WEIGHT: 197.31 LBS | TEMPERATURE: 97.9 F | HEIGHT: 70 IN

## 2021-09-30 DIAGNOSIS — G89.18 ACUTE POST-OPERATIVE PAIN: ICD-10-CM

## 2021-09-30 DIAGNOSIS — K81.0 ACUTE CHOLECYSTITIS: ICD-10-CM

## 2021-09-30 PROBLEM — K80.00 ACUTE CALCULOUS CHOLECYSTITIS: Status: ACTIVE | Noted: 2021-09-26

## 2021-09-30 LAB
ALBUMIN SERPL BCP-MCNC: 3.7 G/DL (ref 3.2–4.9)
ALBUMIN/GLOB SERPL: 1.3 G/DL
ALP SERPL-CCNC: 78 U/L (ref 30–99)
ALT SERPL-CCNC: 79 U/L (ref 2–50)
ANION GAP SERPL CALC-SCNC: 9 MMOL/L (ref 7–16)
APPEARANCE UR: CLEAR
AST SERPL-CCNC: 95 U/L (ref 12–45)
BASOPHILS # BLD AUTO: 0.5 % (ref 0–1.8)
BASOPHILS # BLD: 0.02 K/UL (ref 0–0.12)
BILIRUB SERPL-MCNC: 0.4 MG/DL (ref 0.1–1.5)
BILIRUB UR QL STRIP.AUTO: NEGATIVE
BUN SERPL-MCNC: 6 MG/DL (ref 8–22)
CALCIUM SERPL-MCNC: 9 MG/DL (ref 8.5–10.5)
CHLORIDE SERPL-SCNC: 103 MMOL/L (ref 96–112)
CO2 SERPL-SCNC: 24 MMOL/L (ref 20–33)
COLOR UR: YELLOW
CREAT SERPL-MCNC: 0.63 MG/DL (ref 0.5–1.4)
EOSINOPHIL # BLD AUTO: 0.03 K/UL (ref 0–0.51)
EOSINOPHIL NFR BLD: 0.7 % (ref 0–6.9)
ERYTHROCYTE [DISTWIDTH] IN BLOOD BY AUTOMATED COUNT: 59 FL (ref 35.9–50)
GLOBULIN SER CALC-MCNC: 2.8 G/DL (ref 1.9–3.5)
GLUCOSE SERPL-MCNC: 95 MG/DL (ref 65–99)
GLUCOSE UR STRIP.AUTO-MCNC: NEGATIVE MG/DL
HCT VFR BLD AUTO: 34.5 % (ref 42–52)
HGB BLD-MCNC: 11.2 G/DL (ref 14–18)
IMM GRANULOCYTES # BLD AUTO: 0.07 K/UL (ref 0–0.11)
IMM GRANULOCYTES NFR BLD AUTO: 1.7 % (ref 0–0.9)
KETONES UR STRIP.AUTO-MCNC: NEGATIVE MG/DL
LEUKOCYTE ESTERASE UR QL STRIP.AUTO: NEGATIVE
LIPASE SERPL-CCNC: 240 U/L (ref 11–82)
LYMPHOCYTES # BLD AUTO: 1.07 K/UL (ref 1–4.8)
LYMPHOCYTES NFR BLD: 26.6 % (ref 22–41)
MCH RBC QN AUTO: 32.7 PG (ref 27–33)
MCHC RBC AUTO-ENTMCNC: 32.5 G/DL (ref 33.7–35.3)
MCV RBC AUTO: 100.6 FL (ref 81.4–97.8)
MICRO URNS: NORMAL
MONOCYTES # BLD AUTO: 1.05 K/UL (ref 0–0.85)
MONOCYTES NFR BLD AUTO: 26.1 % (ref 0–13.4)
NEUTROPHILS # BLD AUTO: 1.79 K/UL (ref 1.82–7.42)
NEUTROPHILS NFR BLD: 44.4 % (ref 44–72)
NITRITE UR QL STRIP.AUTO: NEGATIVE
NRBC # BLD AUTO: 0 K/UL
NRBC BLD-RTO: 0 /100 WBC
PATHOLOGY CONSULT NOTE: NORMAL
PH UR STRIP.AUTO: 6.5 [PH] (ref 5–8)
PLATELET # BLD AUTO: 307 K/UL (ref 164–446)
PMV BLD AUTO: 9.5 FL (ref 9–12.9)
POTASSIUM SERPL-SCNC: 4.6 MMOL/L (ref 3.6–5.5)
PROT SERPL-MCNC: 6.5 G/DL (ref 6–8.2)
PROT UR QL STRIP: NEGATIVE MG/DL
RBC # BLD AUTO: 3.43 M/UL (ref 4.7–6.1)
RBC UR QL AUTO: NEGATIVE
SARS-COV+SARS-COV-2 AG RESP QL IA.RAPID: NOTDETECTED
SODIUM SERPL-SCNC: 136 MMOL/L (ref 135–145)
SP GR UR STRIP.AUTO: 1.01
SPECIMEN SOURCE: NORMAL
UROBILINOGEN UR STRIP.AUTO-MCNC: 0.2 MG/DL
WBC # BLD AUTO: 4 K/UL (ref 4.8–10.8)

## 2021-09-30 PROCEDURE — 88304 TISSUE EXAM BY PATHOLOGIST: CPT

## 2021-09-30 PROCEDURE — 501584 HCHG TROCAR, THRD CAN&SEAL11X100: Performed by: SURGERY

## 2021-09-30 PROCEDURE — 700102 HCHG RX REV CODE 250 W/ 637 OVERRIDE(OP): Performed by: ANESTHESIOLOGY

## 2021-09-30 PROCEDURE — 96374 THER/PROPH/DIAG INJ IV PUSH: CPT

## 2021-09-30 PROCEDURE — 501570 HCHG TROCAR, SEPARATOR: Performed by: SURGERY

## 2021-09-30 PROCEDURE — 160002 HCHG RECOVERY MINUTES (STAT): Performed by: SURGERY

## 2021-09-30 PROCEDURE — 700101 HCHG RX REV CODE 250: Performed by: SURGERY

## 2021-09-30 PROCEDURE — 501577 HCHG TROCAR, STEP 11MM: Performed by: SURGERY

## 2021-09-30 PROCEDURE — 502571 HCHG PACK, LAP CHOLE: Performed by: SURGERY

## 2021-09-30 PROCEDURE — 700111 HCHG RX REV CODE 636 W/ 250 OVERRIDE (IP): Performed by: ANESTHESIOLOGY

## 2021-09-30 PROCEDURE — 501338 HCHG SHEARS, ENDO: Performed by: SURGERY

## 2021-09-30 PROCEDURE — 96375 TX/PRO/DX INJ NEW DRUG ADDON: CPT

## 2021-09-30 PROCEDURE — 700101 HCHG RX REV CODE 250: Performed by: ANESTHESIOLOGY

## 2021-09-30 PROCEDURE — 501399 HCHG SPECIMAN BAG, ENDO CATC: Performed by: SURGERY

## 2021-09-30 PROCEDURE — 160029 HCHG SURGERY MINUTES - 1ST 30 MINS LEVEL 4: Performed by: SURGERY

## 2021-09-30 PROCEDURE — 700105 HCHG RX REV CODE 258: Performed by: ANESTHESIOLOGY

## 2021-09-30 PROCEDURE — 500868 HCHG NEEDLE, SURGI(VARES): Performed by: SURGERY

## 2021-09-30 PROCEDURE — 85025 COMPLETE CBC W/AUTO DIFF WBC: CPT

## 2021-09-30 PROCEDURE — 81003 URINALYSIS AUTO W/O SCOPE: CPT

## 2021-09-30 PROCEDURE — 501838 HCHG SUTURE GENERAL: Performed by: SURGERY

## 2021-09-30 PROCEDURE — 160025 RECOVERY II MINUTES (STATS): Performed by: SURGERY

## 2021-09-30 PROCEDURE — 99285 EMERGENCY DEPT VISIT HI MDM: CPT | Mod: 57 | Performed by: SURGERY

## 2021-09-30 PROCEDURE — 700105 HCHG RX REV CODE 258: Performed by: EMERGENCY MEDICINE

## 2021-09-30 PROCEDURE — 87426 SARSCOV CORONAVIRUS AG IA: CPT

## 2021-09-30 PROCEDURE — 160048 HCHG OR STATISTICAL LEVEL 1-5: Performed by: SURGERY

## 2021-09-30 PROCEDURE — 160041 HCHG SURGERY MINUTES - EA ADDL 1 MIN LEVEL 4: Performed by: SURGERY

## 2021-09-30 PROCEDURE — 700111 HCHG RX REV CODE 636 W/ 250 OVERRIDE (IP): Performed by: EMERGENCY MEDICINE

## 2021-09-30 PROCEDURE — 83690 ASSAY OF LIPASE: CPT

## 2021-09-30 PROCEDURE — 501583 HCHG TROCAR, THRD CAN&SEAL 5X100: Performed by: SURGERY

## 2021-09-30 PROCEDURE — 160036 HCHG PACU - EA ADDL 30 MINS PHASE I: Performed by: SURGERY

## 2021-09-30 PROCEDURE — A9270 NON-COVERED ITEM OR SERVICE: HCPCS | Performed by: ANESTHESIOLOGY

## 2021-09-30 PROCEDURE — 160046 HCHG PACU - 1ST 60 MINS PHASE II: Performed by: SURGERY

## 2021-09-30 PROCEDURE — 99291 CRITICAL CARE FIRST HOUR: CPT

## 2021-09-30 PROCEDURE — 160035 HCHG PACU - 1ST 60 MINS PHASE I: Performed by: SURGERY

## 2021-09-30 PROCEDURE — 47562 LAPAROSCOPIC CHOLECYSTECTOMY: CPT | Performed by: SURGERY

## 2021-09-30 PROCEDURE — 160009 HCHG ANES TIME/MIN: Performed by: SURGERY

## 2021-09-30 PROCEDURE — 76705 ECHO EXAM OF ABDOMEN: CPT

## 2021-09-30 PROCEDURE — 80053 COMPREHEN METABOLIC PANEL: CPT

## 2021-09-30 PROCEDURE — 500002 HCHG ADHESIVE, DERMABOND: Performed by: SURGERY

## 2021-09-30 RX ORDER — MAGNESIUM SULFATE HEPTAHYDRATE 40 MG/ML
INJECTION, SOLUTION INTRAVENOUS PRN
Status: DISCONTINUED | OUTPATIENT
Start: 2021-09-30 | End: 2021-09-30 | Stop reason: SURG

## 2021-09-30 RX ORDER — ONDANSETRON 2 MG/ML
INJECTION INTRAMUSCULAR; INTRAVENOUS PRN
Status: DISCONTINUED | OUTPATIENT
Start: 2021-09-30 | End: 2021-09-30 | Stop reason: SURG

## 2021-09-30 RX ORDER — KETOROLAC TROMETHAMINE 30 MG/ML
INJECTION, SOLUTION INTRAMUSCULAR; INTRAVENOUS PRN
Status: DISCONTINUED | OUTPATIENT
Start: 2021-09-30 | End: 2021-09-30 | Stop reason: SURG

## 2021-09-30 RX ORDER — ONDANSETRON 2 MG/ML
4 INJECTION INTRAMUSCULAR; INTRAVENOUS
Status: COMPLETED | OUTPATIENT
Start: 2021-09-30 | End: 2021-09-30

## 2021-09-30 RX ORDER — OXYCODONE HCL 5 MG/5 ML
10 SOLUTION, ORAL ORAL
Status: COMPLETED | OUTPATIENT
Start: 2021-09-30 | End: 2021-09-30

## 2021-09-30 RX ORDER — HYDRALAZINE HYDROCHLORIDE 20 MG/ML
5 INJECTION INTRAMUSCULAR; INTRAVENOUS
Status: DISCONTINUED | OUTPATIENT
Start: 2021-09-30 | End: 2021-09-30 | Stop reason: HOSPADM

## 2021-09-30 RX ORDER — SODIUM CHLORIDE, SODIUM LACTATE, POTASSIUM CHLORIDE, CALCIUM CHLORIDE 600; 310; 30; 20 MG/100ML; MG/100ML; MG/100ML; MG/100ML
1000 INJECTION, SOLUTION INTRAVENOUS ONCE
Status: COMPLETED | OUTPATIENT
Start: 2021-09-30 | End: 2021-09-30

## 2021-09-30 RX ORDER — HYDROMORPHONE HYDROCHLORIDE 1 MG/ML
0.2 INJECTION, SOLUTION INTRAMUSCULAR; INTRAVENOUS; SUBCUTANEOUS
Status: DISCONTINUED | OUTPATIENT
Start: 2021-09-30 | End: 2021-09-30 | Stop reason: HOSPADM

## 2021-09-30 RX ORDER — HALOPERIDOL 5 MG/ML
1 INJECTION INTRAMUSCULAR
Status: DISCONTINUED | OUTPATIENT
Start: 2021-09-30 | End: 2021-09-30 | Stop reason: HOSPADM

## 2021-09-30 RX ORDER — HYDROMORPHONE HYDROCHLORIDE 1 MG/ML
0.4 INJECTION, SOLUTION INTRAMUSCULAR; INTRAVENOUS; SUBCUTANEOUS
Status: DISCONTINUED | OUTPATIENT
Start: 2021-09-30 | End: 2021-09-30 | Stop reason: HOSPADM

## 2021-09-30 RX ORDER — LIDOCAINE HYDROCHLORIDE 20 MG/ML
INJECTION, SOLUTION EPIDURAL; INFILTRATION; INTRACAUDAL; PERINEURAL PRN
Status: DISCONTINUED | OUTPATIENT
Start: 2021-09-30 | End: 2021-09-30 | Stop reason: SURG

## 2021-09-30 RX ORDER — HYDROMORPHONE HYDROCHLORIDE 1 MG/ML
0.1 INJECTION, SOLUTION INTRAMUSCULAR; INTRAVENOUS; SUBCUTANEOUS
Status: DISCONTINUED | OUTPATIENT
Start: 2021-09-30 | End: 2021-09-30 | Stop reason: HOSPADM

## 2021-09-30 RX ORDER — MORPHINE SULFATE 4 MG/ML
4 INJECTION, SOLUTION INTRAMUSCULAR; INTRAVENOUS ONCE
Status: COMPLETED | OUTPATIENT
Start: 2021-09-30 | End: 2021-09-30

## 2021-09-30 RX ORDER — METOCLOPRAMIDE HYDROCHLORIDE 5 MG/ML
INJECTION INTRAMUSCULAR; INTRAVENOUS PRN
Status: DISCONTINUED | OUTPATIENT
Start: 2021-09-30 | End: 2021-09-30 | Stop reason: SURG

## 2021-09-30 RX ORDER — SODIUM CHLORIDE, SODIUM LACTATE, POTASSIUM CHLORIDE, CALCIUM CHLORIDE 600; 310; 30; 20 MG/100ML; MG/100ML; MG/100ML; MG/100ML
INJECTION, SOLUTION INTRAVENOUS
Status: DISCONTINUED | OUTPATIENT
Start: 2021-09-30 | End: 2021-09-30 | Stop reason: SURG

## 2021-09-30 RX ORDER — LABETALOL HYDROCHLORIDE 5 MG/ML
5 INJECTION, SOLUTION INTRAVENOUS
Status: DISCONTINUED | OUTPATIENT
Start: 2021-09-30 | End: 2021-09-30 | Stop reason: HOSPADM

## 2021-09-30 RX ORDER — SODIUM CHLORIDE, SODIUM LACTATE, POTASSIUM CHLORIDE, CALCIUM CHLORIDE 600; 310; 30; 20 MG/100ML; MG/100ML; MG/100ML; MG/100ML
INJECTION, SOLUTION INTRAVENOUS CONTINUOUS
Status: DISCONTINUED | OUTPATIENT
Start: 2021-09-30 | End: 2021-09-30 | Stop reason: HOSPADM

## 2021-09-30 RX ORDER — DEXAMETHASONE SODIUM PHOSPHATE 4 MG/ML
INJECTION, SOLUTION INTRA-ARTICULAR; INTRALESIONAL; INTRAMUSCULAR; INTRAVENOUS; SOFT TISSUE PRN
Status: DISCONTINUED | OUTPATIENT
Start: 2021-09-30 | End: 2021-09-30 | Stop reason: SURG

## 2021-09-30 RX ORDER — MEPERIDINE HYDROCHLORIDE 25 MG/ML
12.5 INJECTION INTRAMUSCULAR; INTRAVENOUS; SUBCUTANEOUS
Status: DISCONTINUED | OUTPATIENT
Start: 2021-09-30 | End: 2021-09-30 | Stop reason: HOSPADM

## 2021-09-30 RX ORDER — ONDANSETRON 2 MG/ML
4 INJECTION INTRAMUSCULAR; INTRAVENOUS ONCE
Status: COMPLETED | OUTPATIENT
Start: 2021-09-30 | End: 2021-09-30

## 2021-09-30 RX ORDER — BUPIVACAINE HYDROCHLORIDE AND EPINEPHRINE 2.5; 5 MG/ML; UG/ML
INJECTION, SOLUTION EPIDURAL; INFILTRATION; INTRACAUDAL; PERINEURAL
Status: DISCONTINUED | OUTPATIENT
Start: 2021-09-30 | End: 2021-09-30 | Stop reason: HOSPADM

## 2021-09-30 RX ORDER — ONDANSETRON 4 MG/1
4 TABLET, ORALLY DISINTEGRATING ORAL EVERY 6 HOURS PRN
Qty: 12 TABLET | Refills: 1 | Status: SHIPPED | OUTPATIENT
Start: 2021-09-30

## 2021-09-30 RX ORDER — OXYCODONE HCL 5 MG/5 ML
5 SOLUTION, ORAL ORAL
Status: COMPLETED | OUTPATIENT
Start: 2021-09-30 | End: 2021-09-30

## 2021-09-30 RX ORDER — SODIUM CHLORIDE 9 MG/ML
INJECTION, SOLUTION INTRAVENOUS CONTINUOUS
Status: DISCONTINUED | OUTPATIENT
Start: 2021-09-30 | End: 2021-09-30 | Stop reason: HOSPADM

## 2021-09-30 RX ORDER — HYDROCODONE BITARTRATE AND ACETAMINOPHEN 5; 325 MG/1; MG/1
1 TABLET ORAL EVERY 4 HOURS PRN
Qty: 18 TABLET | Refills: 0 | Status: SHIPPED | OUTPATIENT
Start: 2021-09-30 | End: 2021-10-03

## 2021-09-30 RX ORDER — PHENYLEPHRINE HYDROCHLORIDE 10 MG/ML
INJECTION, SOLUTION INTRAMUSCULAR; INTRAVENOUS; SUBCUTANEOUS PRN
Status: DISCONTINUED | OUTPATIENT
Start: 2021-09-30 | End: 2021-09-30 | Stop reason: SURG

## 2021-09-30 RX ADMIN — DEXAMETHASONE SODIUM PHOSPHATE 4 MG: 4 INJECTION, SOLUTION INTRA-ARTICULAR; INTRALESIONAL; INTRAMUSCULAR; INTRAVENOUS; SOFT TISSUE at 14:44

## 2021-09-30 RX ADMIN — SODIUM CHLORIDE, POTASSIUM CHLORIDE, SODIUM LACTATE AND CALCIUM CHLORIDE: 600; 310; 30; 20 INJECTION, SOLUTION INTRAVENOUS at 14:38

## 2021-09-30 RX ADMIN — MORPHINE SULFATE 4 MG: 4 INJECTION INTRAVENOUS at 10:55

## 2021-09-30 RX ADMIN — ONDANSETRON 4 MG: 2 INJECTION INTRAMUSCULAR; INTRAVENOUS at 10:55

## 2021-09-30 RX ADMIN — PIPERACILLIN AND TAZOBACTAM 3.38 G: 3; .375 INJECTION, POWDER, LYOPHILIZED, FOR SOLUTION INTRAVENOUS; PARENTERAL at 13:11

## 2021-09-30 RX ADMIN — PHENYLEPHRINE HYDROCHLORIDE 200 MCG: 10 INJECTION INTRAVENOUS at 14:50

## 2021-09-30 RX ADMIN — ONDANSETRON 4 MG: 2 INJECTION INTRAMUSCULAR; INTRAVENOUS at 15:50

## 2021-09-30 RX ADMIN — OXYCODONE HYDROCHLORIDE 10 MG: 5 SOLUTION ORAL at 15:46

## 2021-09-30 RX ADMIN — LIDOCAINE HYDROCHLORIDE 100 MG: 20 INJECTION, SOLUTION EPIDURAL; INFILTRATION; INTRACAUDAL at 14:44

## 2021-09-30 RX ADMIN — FENTANYL CITRATE 50 MCG: 50 INJECTION, SOLUTION INTRAMUSCULAR; INTRAVENOUS at 15:00

## 2021-09-30 RX ADMIN — SUGAMMADEX 200 MG: 100 INJECTION, SOLUTION INTRAVENOUS at 15:33

## 2021-09-30 RX ADMIN — FENTANYL CITRATE 100 MCG: 50 INJECTION, SOLUTION INTRAMUSCULAR; INTRAVENOUS at 15:34

## 2021-09-30 RX ADMIN — PROPOFOL 200 MG: 10 INJECTION, EMULSION INTRAVENOUS at 14:44

## 2021-09-30 RX ADMIN — ROCURONIUM BROMIDE 20 MG: 10 INJECTION, SOLUTION INTRAVENOUS at 14:56

## 2021-09-30 RX ADMIN — METOCLOPRAMIDE 10 MG: 5 INJECTION, SOLUTION INTRAMUSCULAR; INTRAVENOUS at 15:26

## 2021-09-30 RX ADMIN — MAGNESIUM SULFATE IN WATER 2 G: 40 INJECTION, SOLUTION INTRAVENOUS at 14:44

## 2021-09-30 RX ADMIN — ONDANSETRON 4 MG: 2 INJECTION INTRAMUSCULAR; INTRAVENOUS at 15:26

## 2021-09-30 RX ADMIN — SODIUM CHLORIDE, POTASSIUM CHLORIDE, SODIUM LACTATE AND CALCIUM CHLORIDE 1000 ML: 600; 310; 30; 20 INJECTION, SOLUTION INTRAVENOUS at 10:13

## 2021-09-30 RX ADMIN — ROCURONIUM BROMIDE 30 MG: 10 INJECTION, SOLUTION INTRAVENOUS at 14:44

## 2021-09-30 RX ADMIN — FENTANYL CITRATE 50 MCG: 50 INJECTION, SOLUTION INTRAMUSCULAR; INTRAVENOUS at 15:07

## 2021-09-30 RX ADMIN — SODIUM CHLORIDE: 900 INJECTION INTRAVENOUS at 13:10

## 2021-09-30 RX ADMIN — KETOROLAC TROMETHAMINE 30 MG: 30 INJECTION, SOLUTION INTRAMUSCULAR at 15:26

## 2021-09-30 RX ADMIN — MIDAZOLAM 2 MG: 1 INJECTION INTRAMUSCULAR; INTRAVENOUS at 14:38

## 2021-09-30 RX ADMIN — Medication 160 MG: at 14:44

## 2021-09-30 RX ADMIN — FENTANYL CITRATE 50 MCG: 50 INJECTION, SOLUTION INTRAMUSCULAR; INTRAVENOUS at 15:23

## 2021-09-30 RX ADMIN — FENTANYL CITRATE 50 MCG: 50 INJECTION INTRAMUSCULAR; INTRAVENOUS at 15:46

## 2021-09-30 RX ADMIN — FENTANYL CITRATE 50 MCG: 50 INJECTION INTRAMUSCULAR; INTRAVENOUS at 15:54

## 2021-09-30 ASSESSMENT — PAIN DESCRIPTION - PAIN TYPE
TYPE: ACUTE PAIN
TYPE: SURGICAL PAIN

## 2021-09-30 ASSESSMENT — ENCOUNTER SYMPTOMS
ABDOMINAL PAIN: 1
VOMITING: 1
CHILLS: 0
FEVER: 0
NAUSEA: 1
BLOOD IN STOOL: 0
DIARRHEA: 0

## 2021-09-30 ASSESSMENT — FIBROSIS 4 INDEX: FIB4 SCORE: 3.97

## 2021-09-30 NOTE — ANESTHESIA PROCEDURE NOTES
Airway    Date/Time: 9/30/2021 2:45 PM  Performed by: Avinash Zepeda M.D.  Authorized by: Avinash Zepeda M.D.     Location:  OR  Urgency:  Elective  Difficult Airway: No    Indications for Airway Management:  Anesthesia      Spontaneous Ventilation: absent    Sedation Level:  Deep  Preoxygenated: Yes    Patient Position:  Sniffing  Final Airway Type:  Endotracheal airway  Final Endotracheal Airway:  ETT  Cuffed: Yes    Technique Used for Successful ETT Placement:  Direct laryngoscopy  Devices/Methods Used in Placement:  Intubating stylet    Insertion Site:  Oral  Blade Type:  Juany  Laryngoscope Blade/Videolaryngoscope Blade Size:  4  ETT Size (mm):  7.5  Measured from:  Teeth  ETT to Teeth (cm):  23  Placement Verified by: auscultation and capnometry    Cormack-Lehane Classification:  Grade I - full view of glottis  Number of Attempts at Approach:  1

## 2021-09-30 NOTE — OR NURSING
1540- Pt arrives to PACU from OR on 6L of oxygen via mask. Report received. Lap stab sites to abd CDI and open to air. Pt states pain 10/10, medicated per MAR.     1606- Pt friend Adrian called and updated on POC and pt status.     1640- Pt tolerating juice and saltines.

## 2021-09-30 NOTE — ED TRIAGE NOTES
"Pt ambulatory to triage c/o abdominal pain x 1 week and states was hospitalized last week to detox from alcohol and during his stay they discovered he has \"a bad gallbladder that needs to come out but my appt isn't for a month\" pt states he is friends with Dr. Cerna who told him to come in to ER for gallbladder removal. Nad. vss  "

## 2021-09-30 NOTE — OP REPORT
DATE OF OPERATION: 9/30/2021    PREOPERATIVE DIAGNOSIS: Acute calculus cholecystitis  POSTOPERATIVE DIAGNOSIS: Acute calculus cholecystitis    PROCEDURE: Laparoscopic cholecystectomy    SURGEON: Tisha Alves MD   ASSISTANT:  Diego Jones MS4    ANESTHESIOLOGIST: Avinash Zepeda MD with [facility]    SPECIMENS: Gallbladder and contents.     FINDINGS: Mildly edematous gallbladder. Very short cystic artery and very long right hepatic artery.     INDICATIONS: This is a 56 year old man who has been having right upper quadrant abdominal pain for a week and was found to have gallbladder wall thickening and sludge.  We discussed definitive surgical therapy with risks including, but   not limited to bleeding, infection, damage to the common duct, possibly   requiring hepaticojejunostomy. Patient understood and agreed to proceed.     DESCRIPTION OF PROCEDURE: The patient was brought to the operating room. He   was placed in a comfortable supine position on the operating room table with   all appropriate points padded. General anesthesia was induced without   complication. A time-out was held and completed confirming correct patient,   correct site, correct procedure and SCDs on and functioning. The patient received   antibiotics prior to incision. After prepping the abdomen with ChloraPrep and   draping in usual sterile fashion, 0.25% Marcaine with epinephrine was   infiltrated supraumbilically and a 1-cm incision was made in this area. This   was taken down the fascia bluntly using a hemostat and a penetrating towel   clip was used to grasp the umbilical stalk and elevate the abdominal wall. A   Veress needle was placed into the abdomen and a saline drop test showed no   evidence of injury to bowel or vessel. I then insufflated the abdomen to a   pressure of 15 mmHg with CO2. An 11-mm trocar was placed through this   incision into the abdomen and a camera was then inserted confirming no   evidence of injury to bowel or vessel.   I then placed an 11-mm   trocar in the epigastrium and two 5-mm trocars in the right upper quadrant   under direct visualization after appropriate numbing of the skin. I grasped   the fundus of the gallbladder lifted up over the liver edge. I then grasped the   infundibulum of the gallbladder and   retracted it laterally and circumferentially dissected the cystic duct and the   cystic artery from lateral to medial using a Maryland grasper. The cystic artery was very short and the right hepatic artery was very close to the gallbladder. I was sure to clip only the cystic artery, and leave the right hepatic artery intact. Once I had satisfactorily obtained the critical view of safety, I then placed   three 10-mm clips on the patient's side and one on the specimen side on the   cystic duct and divided sharply. I then placed 2 clips on the cystic artery   on the patient's side and one on the specimen side and divided sharply and   removed the gallbladder from the liver bed using Bovie electrocautery. I did spill bile and sludge, which was irrigated copiously until clear.  The gallbladder was then placed in an EndoCatch   bag and removed through the epigastric port incision. I then obtained   excellent hemostasis and copiously irrigated the area with warm   saline until entirely clear. I closed the fascia on the epigastric port using   a single interrupted 0 Vicryl with an Endoclose under direct visualization   and then removed the two 5-mm ports confirming good hemostasis. I closed the   fascia on the umbilical port using a single interrupted 0 Vicryl suture and   irrigated all 4 incisions. I then closed the skin using a single running 4-0   Vicryl in a subcuticular fashion. These were then dressed with dry dressings   and patient was awoken from anesthesia in good condition having tolerated the   procedure well. All counts were correct at the end of the case x2.

## 2021-09-30 NOTE — DISCHARGE INSTRUCTIONS
ACTIVITY: Rest and take it easy for the first 24 hours.  A responsible adult is recommended to remain with you during that time.  It is normal to feel sleepy.  We encourage you to not do anything that requires balance, judgment or coordination.    MILD FLU-LIKE SYMPTOMS ARE NORMAL. YOU MAY EXPERIENCE GENERALIZED MUSCLE ACHES, THROAT IRRITATION, HEADACHE AND/OR SOME NAUSEA.    FOR 24 HOURS DO NOT:  Drive, operate machinery or run household appliances.  Drink beer or alcoholic beverages.   Make important decisions or sign legal documents.    SPECIAL INSTRUCTIONS:   1. DIET: Upon discharge from the hospital you may resume your normal preoperative diet. Depending on how you are feeling and whether you have nausea or not, you may wish to stay with a bland diet for the first few days. However, you can advance this as quickly as you feel ready.    2. ACTIVITIES: After discharge from the hospital, you may resume full routine activities. However, there should be no heavy lifting (greater than 15 pounds) and no strenuous activities until after your follow-up visit. Otherwise, routine activities of daily living are acceptable.    3. DRIVING: You may drive whenever you are off pain medications and are able to perform the activities needed to drive, i.e. turning, bending, twisting, etc.    4. BATHING: You may get the wound wet at any time after leaving the hospital. You may shower, but do not submerge in a bath for at least a week. Dressings may come off after 48 hours, but will peel off by themselves in the next 7-10 days.    5. BOWEL FUNCTION: Constipation is common after an operation, especially with pain medications. The combination of pain medication and decreased activity level can cause constipation in otherwise normal patients. If you feel this is occurring, take a laxative (Milk of Magnesia, Ex-Lax, Senokot, etc.) until the problem has resolved.    6. PAIN MEDICATION: You will be given a prescription for pain  medication at discharge. Please take these as directed. It is important to remember not to take medications on an empty stomach as this may cause nausea.    7.CALL IF YOU HAVE: (1) Fevers to more than 101F, (2) Unusual chest or leg pain, (3) Drainage or fluid from incision that may be foul smelling, increased tenderness or soreness at the wound or the wound edges are no longer together, redness or swelling at the incision site. Please do not hesitate to call with any other questions.     8. APPOINTMENT: Contact our office at [phone] for a follow-up appointment in 1 week following your procedure.    If you have any additional questions, please do not hesitate to call the office and speak to either myself or the physician on call.    Office address:  [address] [place] [address] B    Tisha Alves MD  [facility]  [phone]      DIET: To avoid nausea, slowly advance diet as tolerated, avoiding spicy or greasy foods for the first day.  Add more substantial food to your diet according to your physician's instructions.  INCREASE FLUIDS AND FIBER TO AVOID CONSTIPATION.    SURGICAL DRESSING/BATHING: You may shower normally. No bathing, swimming until cleared by your doctor    FOLLOW-UP APPOINTMENT:  A follow-up appointment should be arranged with your doctor; call to schedule.    You should CALL YOUR PHYSICIAN if you develop:  Fever greater than 101 degrees F.  Pain not relieved by medication, or persistent nausea or vomiting.  Excessive bleeding (blood soaking through dressing) or unexpected drainage from the wound.  Extreme redness or swelling around the incision site, drainage of pus or foul smelling drainage.  Inability to urinate or empty your bladder within 8 hours.  Problems with breathing or chest pain.    You should call 911 if you develop problems with breathing or chest pain.  If you are unable to contact your doctor or surgical center, you should go to the nearest emergency room or  urgent care center.  Physician's telephone #: Dr Palafox [phone]    If any questions arise, call your doctor.  If your doctor is not available, please feel free to call the Surgical Center at [phone]. The Contact Center is open Monday through Friday 7AM to 5PM and may speak to a nurse at [phone], or toll free at [phone].     A registered nurse may call you a few days after your surgery to see how you are doing after your procedure.    MEDICATIONS: Resume taking daily medication.  Take prescribed pain medication with food.  If no medication is prescribed, you may take non-aspirin pain medication if needed.  PAIN MEDICATION CAN BE VERY CONSTIPATING.  Take a stool softener or laxative such as senokot, pericolace, or milk of magnesia if needed.    Prescription given for Norco.  Last pain medication given at 3:45pm.    If your physician has prescribed pain medication that includes Acetaminophen (Tylenol), do not take additional Acetaminophen (Tylenol) while taking the prescribed medication.    Depression / Suicide Risk    As you are discharged from this [facility] facility, it is important to learn how to keep safe from harming yourself.    Recognize the warning signs:  · Abrupt changes in personality, positive or negative- including increase in energy   · Giving away possessions  · Change in eating patterns- significant weight changes-  positive or negative  · Change in sleeping patterns- unable to sleep or sleeping all the time   · Unwillingness or inability to communicate  · Depression  · Unusual sadness, discouragement and loneliness  · Talk of wanting to die  · Neglect of personal appearance   · Rebelliousness- reckless behavior  · Withdrawal from people/activities they love  · Confusion- inability to concentrate     If you or a loved one observes any of these behaviors or has concerns about self-harm, here's what you can do:  · Talk about it- your feelings and reasons for harming  yourself  · Remove any means that you might use to hurt yourself (examples: pills, rope, extension cords, firearm)  · Get professional help from the community (Mental Health, Substance Abuse, psychological counseling)  · Do not be alone:Call your Safe Contact- someone whom you trust who will be there for you.  · Call your local CRISIS HOTLINE 790-7490 or 561-143-1352  · Call your local Children's Mobile Crisis Response Team [place] (330) 015-3566 or www.[employer]  · Call the toll free National Suicide Prevention Hotlines   · National Suicide Prevention Lifeline 927-045-OKXF (4453)  · National Hope Line Network 800-SUICIDE (918-9520)

## 2021-09-30 NOTE — ED PROVIDER NOTES
ED Provider Note    Scribed for Emanuel Mohr M.D. by Beverly Mcconnell. 9/30/2021, 9:50 AM.    Primary care provider: Pcp Pt States None  Means of arrival: Walk in  History obtained from: Patient  History limited by: None    CHIEF COMPLAINT  Chief Complaint   Patient presents with   • Abdominal Pain       HPI  Mc Sosa is a 56 y.o. male who presents to the Emergency Department for right upper quadrant abdominal pain. Onset was one week ago. Pain is localized to his right upper quadrant. Patient was seen and evaluated last week for detox from alcohol. He was diagnosed with cholecystitis at that time, but his surgery is scheduled one month out at [facility]. Dr. Cerna, general surgery, instructed him to come to the ED today. Exacerbating factors include eating. No alleviating factors were identified. He reports associated nausea, and vomiting. He denies any associated diarrhea, black/bloody stool, fever, or chills. He denies continued alcohol use. He denies any abdominal surgeries. His last PO intake was a cup of tea this morning.      REVIEW OF SYSTEMS  Review of Systems   Constitutional: Negative for chills and fever.   Gastrointestinal: Positive for abdominal pain, nausea and vomiting. Negative for blood in stool, diarrhea and melena.   All other systems reviewed and are negative.    PAST MEDICAL HISTORY   has a past medical history of ASTHMA and Hypertension.    SURGICAL HISTORY   has a past surgical history that includes other orthopedic surgery.    SOCIAL HISTORY  Social History     Tobacco Use   • Smoking status: Never Smoker   • Smokeless tobacco: Never Used   Vaping Use   • Vaping Use: Never used   Substance Use Topics   • Alcohol use: Yes     Comment: last drink 4 days ago   • Drug use: No      Social History     Substance and Sexual Activity   Drug Use No       FAMILY HISTORY  Family History   Family history unknown: Yes       CURRENT MEDICATIONS  Home Medications     Reviewed by  "Walker Clark (Pharmacy Tech) on 09/30/21 at 1054  Med List Status: Complete   Medication Last Dose Status   ciprofloxacin (CIPRO) 500 MG Tab 9/30/2021 Active   metroNIDAZOLE (FLAGYL) 500 MG Tab 9/28/2021 Active   multivitamin (THERAGRAN) Tab 9/29/2021 Active                ALLERGIES  No Known Allergies    PHYSICAL EXAM  VITAL SIGNS: /75   Pulse 80   Temp 36.8 °C (98.3 °F) (Tympanic)   Resp 16   Ht 1.778 m (5' 10\")   Wt 89.5 kg (197 lb 5 oz)   SpO2 99%   BMI 28.31 kg/m²   Vitals reviewed.  Constitutional: Well developed, Well nourished, No acute distress, Non-toxic appearance.   HENT: Normocephalic, Atraumatic, Bilateral external ears normal, Oropharynx moist, No oral exudates, Nose normal.   Eyes: PERRL, EOMI, Conjunctiva normal, No discharge.   Neck: Normal range of motion, No tenderness, Supple, No stridor.   Cardiovascular: Normal heart rate, Normal rhythm, No murmurs, No rubs, No gallops.   Thorax & Lungs: Normal breath sounds, No respiratory distress, No wheezing, No chest tenderness.   Abdomen: Bowel sounds normal, Soft, right upper quadrant tenderness, positive Coley sign.  Skin: Warm, Dry, No erythema, No rash.   Back: No tenderness, No CVA tenderness.   Musculoskeletal: Good range of motion in all major joints.  Neurologic: Alert, No focal deficits noted.   Psychiatric: Affect normal    LABS  Results for orders placed or performed during the hospital encounter of 09/30/21   CBC WITH DIFFERENTIAL   Result Value Ref Range    WBC 4.0 (L) 4.8 - 10.8 K/uL    RBC 3.43 (L) 4.70 - 6.10 M/uL    Hemoglobin 11.2 (L) 14.0 - 18.0 g/dL    Hematocrit 34.5 (L) 42.0 - 52.0 %    .6 (H) 81.4 - 97.8 fL    MCH 32.7 27.0 - 33.0 pg    MCHC 32.5 (L) 33.7 - 35.3 g/dL    RDW 59.0 (H) 35.9 - 50.0 fL    Platelet Count 307 164 - 446 K/uL    MPV 9.5 9.0 - 12.9 fL    Neutrophils-Polys 44.40 44.00 - 72.00 %    Lymphocytes 26.60 22.00 - 41.00 %    Monocytes 26.10 (H) 0.00 - 13.40 %    Eosinophils 0.70 0.00 - " 6.90 %    Basophils 0.50 0.00 - 1.80 %    Immature Granulocytes 1.70 (H) 0.00 - 0.90 %    Nucleated RBC 0.00 /100 WBC    Neutrophils (Absolute) 1.79 (L) 1.82 - 7.42 K/uL    Lymphs (Absolute) 1.07 1.00 - 4.80 K/uL    Monos (Absolute) 1.05 (H) 0.00 - 0.85 K/uL    Eos (Absolute) 0.03 0.00 - 0.51 K/uL    Baso (Absolute) 0.02 0.00 - 0.12 K/uL    Immature Granulocytes (abs) 0.07 0.00 - 0.11 K/uL    NRBC (Absolute) 0.00 K/uL   COMP METABOLIC PANEL   Result Value Ref Range    Sodium 136 135 - 145 mmol/L    Potassium 4.6 3.6 - 5.5 mmol/L    Chloride 103 96 - 112 mmol/L    Co2 24 20 - 33 mmol/L    Anion Gap 9.0 7.0 - 16.0    Glucose 95 65 - 99 mg/dL    Bun 6 (L) 8 - 22 mg/dL    Creatinine 0.63 0.50 - 1.40 mg/dL    Calcium 9.0 8.5 - 10.5 mg/dL    AST(SGOT) 95 (H) 12 - 45 U/L    ALT(SGPT) 79 (H) 2 - 50 U/L    Alkaline Phosphatase 78 30 - 99 U/L    Total Bilirubin 0.4 0.1 - 1.5 mg/dL    Albumin 3.7 3.2 - 4.9 g/dL    Total Protein 6.5 6.0 - 8.2 g/dL    Globulin 2.8 1.9 - 3.5 g/dL    A-G Ratio 1.3 g/dL   LIPASE   Result Value Ref Range    Lipase 240 (H) 11 - 82 U/L   ESTIMATED GFR   Result Value Ref Range    GFR If African American >60 >60 mL/min/1.73 m 2    GFR If Non African American >60 >60 mL/min/1.73 m 2   SARS-COV Antigen PADILLA: Collect dry nasal swab   Result Value Ref Range    SARS-CoV-2 Source Nasal Swab     SARS-COV ANTIGEN PADILLA NotDetected Not-Detected       All labs reviewed by me.    RADIOLOGY  US-RUQ   Final Result      1.  There is continued mild dilatation of the gallbladder containing tumefactive sludge with borderline gallbladder wall thickening. There is no biliary dilatation. Again, recommend clinical correlation in regards to the possibility of acute    cholecystitis.   2.  Liver is echogenic most consistent with hepatic steatosis.        The radiologist's interpretation of all radiological studies have been reviewed by me.    COURSE & MEDICAL DECISION MAKING  Pertinent Labs & Imaging studies reviewed. (See chart  for details)    9:50 AM Patient seen and examined at bedside. Discussed plan of care with the patient. I informed him we will order lab including blood work to evaluate. He is understanding and agreeable to plan. The patient presents with abdominal pain, and the differential diagnosis includes but is not limited to cholecystitis, symptomatic cholelithiasis, peptic ulcer disease, pancreatitis, alcoholic gastritis.. Ordered for UA, Lipase, CMP, CBC with diff, Estimated GFR, SARS-COV swab to evaluate. Patient will be treated with LR infusion bolus for his symptoms.      10:03 AM - Paged surgery.    10:20 AM - I discussed the patient's case and the above findings with Dr. Cerna (surgery) who is not on-call or available.  Recommend to call the on-call surgeon after an ultrasound. Ordered US-RUQ to evaluate.     10:52 AM - Patient will be treated with Zofran 4 mg injection, and Morphine 4 mg injection for nausea and pain management.     12:31 PM - Paged surgery.     1:00 PM - I discussed the patient's case and the above findings with Dr. Palafox (surgery) who agreed to evaluate the patient for surgery.     1:03 PM - Patient was reevaluated at bedside. He is resting comfortably in bed at this time. I updated the patient regarding plan for surgery. He is understanding and agreeable to plan.     Ultrasound shows sludge in the gallbladder thickened gallbladder wall normal.  Case is to be discussed with the general surgeon.  He is given a dose of Zosyn.  Will be hospice for further work-up and treatment.    HYDRATION: Based on the patient's presentation of Acute Vomiting the patient was given IV fluids. IV Hydration was used because oral hydration was not adequate alone. Upon recheck following hydration, the patient was mildly improved.     DISPOSITION:  Patient will be hospitalized by Dr. Palafox in guarded condition.    FINAL IMPRESSION  1. Acute cholecystitis          IBeverly (Pati), am scribing for, and in  the presence of, Emanuel Mohr M.D..    Electronically signed by: Beverly Mcconnell (Scribe), 9/30/2021    I, Emanuel Mohr M.D. personally performed the services described in this documentation, as scribed by Beverly Mcconnell in my presence, and it is both accurate and complete.    C    The note accurately reflects work and decisions made by me.  Emanuel Mohr M.D.  9/30/2021  1:15 PM

## 2021-09-30 NOTE — ANESTHESIA TIME REPORT
Anesthesia Start and Stop Event Times     Date Time Event    9/30/2021 1427 Ready for Procedure     1438 Anesthesia Start     1540 Anesthesia Stop        Responsible Staff  09/30/21    Name Role Begin End    Avinash Zepeda M.D. Anesth 1438 1540        Preop Diagnosis (Free Text):  Pre-op Diagnosis     ACUTE CHOLECYSTITIS        Preop Diagnosis (Codes):    Premium Reason  B. 1st Call    Comments:

## 2021-09-30 NOTE — ANESTHESIA PREPROCEDURE EVALUATION
Acute cholecystitis  HTN  Asthma    Physical Exam    Airway   Mallampati: II  TM distance: >3 FB  Neck ROM: full       Cardiovascular - normal exam  Rhythm: regular  Rate: normal  (-) murmur     Dental - normal exam           Pulmonary - normal exam  Breath sounds clear to auscultation     Abdominal    Neurological - normal exam                 Anesthesia Plan    ASA 2       Plan - general       Airway plan will be ETT          Induction: intravenous    Postoperative Plan: Postoperative administration of opioids is intended.    Pertinent diagnostic labs and testing reviewed    Informed Consent:    Anesthetic plan and risks discussed with patient.

## 2021-09-30 NOTE — ED NOTES
Pharmacy Medication Reconciliation    ~Med rec updated and complete per pt at bedside  ~Allergies have been verified   ~Pt home pharmacy:[facility]      ~Patient reports that he is on a 6 day course of Cipro that was started on 09/27/21    ~Patient reports that he stopped a 6 day course of Flagyl on 09/28/2021

## 2021-09-30 NOTE — H&P
Surgery General History & Physical Note    Date  9/30/2021    Primary Care Physician  Pcp Pt States None    Requesting physician: Dr. Mohr of the emergency department    CC  Right upper quadrant pain    HPI  This is a 56 y.o. male who presented with right upper quadrant pain for about a week's duration. He was admitted 5 days ago for alcohol detox, but his abdominal pain has persisted. He has spoken with Dr. Hoskins of GI and Dr. Cerna of surgery who recommended he have a cholecystectomy after sludge and minimal gallbladder wall thickening were found on ultrasound during his previous admission. Unfortunately his appointment with Dr. Cerna isn't for a month and his pain has become intolerable, so he was advised to come to the emergency department.     Past Medical History:   Diagnosis Date   • ASTHMA    • Hypertension        Past Surgical History:   Procedure Laterality Date   • OTHER ORTHOPEDIC SURGERY      r ankle       Current Facility-Administered Medications   Medication Dose Route Frequency Provider Last Rate Last Admin   • piperacillin-tazobactam (ZOSYN) 3.375 g in  mL IVPB  3.375 g Intravenous Once Emanuel Mohr M.D. 200 mL/hr at 09/30/21 1311 3.375 g at 09/30/21 1311   • NS infusion   Intravenous Continuous Emanuel Mohr M.D. 125 mL/hr at 09/30/21 1310 New Bag at 09/30/21 1310     Current Outpatient Medications   Medication Sig Dispense Refill   • multivitamin (THERAGRAN) Tab Take 1 Tablet by mouth every day for 90 days. 90 Tablet 0   • ciprofloxacin (CIPRO) 500 MG Tab Take 1 Tablet by mouth 2 times a day for 6 days. 12 Tablet 0   • metroNIDAZOLE (FLAGYL) 500 MG Tab Take 1 Tablet by mouth every 8 hours for 6 days. 18 Tablet 0       Social History     Socioeconomic History   • Marital status:      Spouse name: Not on file   • Number of children: Not on file   • Years of education: Not on file   • Highest education level: Not on file   Occupational History   • Not on file   Tobacco Use    • Smoking status: Never Smoker   • Smokeless tobacco: Never Used   Vaping Use   • Vaping Use: Never used   Substance and Sexual Activity   • Alcohol use: Yes     Comment: last drink 4 days ago   • Drug use: No   • Sexual activity: Yes     Partners: Female   Other Topics Concern   • Not on file   Social History Narrative   • Not on file     Social Determinants of Health     Financial Resource Strain:    • Difficulty of Paying Living Expenses:    Food Insecurity:    • Worried About Running Out of Food in the Last Year:    • Ran Out of Food in the Last Year:    Transportation Needs:    • Lack of Transportation (Medical):    • Lack of Transportation (Non-Medical):    Physical Activity:    • Days of Exercise per Week:    • Minutes of Exercise per Session:    Stress:    • Feeling of Stress :    Social Connections:    • Frequency of Communication with Friends and Family:    • Frequency of Social Gatherings with Friends and Family:    • Attends Spiritism Services:    • Active Member of Clubs or Organizations:    • Attends Club or Organization Meetings:    • Marital Status:    Intimate Partner Violence:    • Fear of Current or Ex-Partner:    • Emotionally Abused:    • Physically Abused:    • Sexually Abused:        Family History   Family history unknown: Yes       Allergies  Patient has no known allergies.    Review of Systems  General: No weight changes or fatigue   HEENT: No changes in vision or trouble swallowing  CV: No chest pain or palpitations  Pulm: No shortness of breath or cough  GI: As above in HPI, no melena or hematochezia, no hematemesis  : No dysuria or hematuria  MSK: No joint or muscle pain  Skin: No new rashes or lesions  Lymph/Heme: No swelling or easy bruising, no lymphadenopathy  Neuro: No headaches or seizures  Psych: No SI/HI      Physical Exam  Constitutional:       General: He is in acute distress.      Appearance: Normal appearance. He is not ill-appearing, toxic-appearing or diaphoretic.    HENT:      Head: Normocephalic and atraumatic.      Right Ear: External ear normal.      Left Ear: External ear normal.      Mouth/Throat:      Mouth: Mucous membranes are moist.   Eyes:      Extraocular Movements: Extraocular movements intact.   Cardiovascular:      Rate and Rhythm: Normal rate and regular rhythm.   Pulmonary:      Effort: Pulmonary effort is normal.   Abdominal:      General: Abdomen is flat. There is no distension.      Tenderness: There is abdominal tenderness.      Comments: +RUQ tenderness   Musculoskeletal:         General: Normal range of motion.   Skin:     General: Skin is warm and dry.      Capillary Refill: Capillary refill takes less than 2 seconds.   Neurological:      General: No focal deficit present.      Mental Status: He is alert.   Psychiatric:         Mood and Affect: Mood normal.         Behavior: Behavior normal.         Vital Signs  Blood Pressure: 120/68   Temperature: 36.8 °C (98.3 °F)   Pulse: 66   Respiration: 16   Pulse Oximetry: 98 %       Labs:  Recent Labs     09/30/21  0836   WBC 4.0*   RBC 3.43*   HEMOGLOBIN 11.2*   HEMATOCRIT 34.5*   .6*   MCH 32.7   MCHC 32.5*   RDW 59.0*   PLATELETCT 307   MPV 9.5     Recent Labs     09/30/21  0836   SODIUM 136   POTASSIUM 4.6   CHLORIDE 103   CO2 24   GLUCOSE 95   BUN 6*   CREATININE 0.63   CALCIUM 9.0         Recent Labs     09/30/21  0836   ASTSGOT 95*   ALTSGPT 79*   TBILIRUBIN 0.4   ALKPHOSPHAT 78   GLOBULIN 2.8       Radiology:  US-RUQ   Final Result      1.  There is continued mild dilatation of the gallbladder containing tumefactive sludge with borderline gallbladder wall thickening. There is no biliary dilatation. Again, recommend clinical correlation in regards to the possibility of acute    cholecystitis.   2.  Liver is echogenic most consistent with hepatic steatosis.        On my review of the images there is minimal gallbladder wall thickening to 3mm, sludge throughout the gallbladder, no pericholecystic  fluid    Assessment/Plan:  Early acute cholecystitis  Procedure(s):  CHOLECYSTECTOMY, LAPAROSCOPIC     I discussed the diagnosis with his and recommend a cholecystectomy. We discussed risks, benefits, and alternatives with risks, including, but not limited to, bleeding, infection, damage to surrounding structures such as small or large intestine, damage to the common bile duct possibly requiring hepaticojejunostomy, possible cystic duct stump leak requiring further procedures, hernia, need for an open procedure. All questions were answered to the patient's apparent satisfaction and they agreed to proceed.

## 2021-10-01 NOTE — OR NURSING
Arrived to Phase II after report from KATE Foy.    VSS, denies nausea, reports pain tolerable. Ambulated from gurney to chair with standby assist.    Surgical site CDI to abdomen    Alarms on and set to appropriate parameters. Call light within reach, rounding in place.

## 2021-10-04 NOTE — ANESTHESIA POSTPROCEDURE EVALUATION
Patient: Mc Sosa    Procedure Summary     Date: 09/30/21 Room / Location: [place] [address] / SURGERY [facility]    Anesthesia Start: 1438 Anesthesia Stop: 1540    Procedure: CHOLECYSTECTOMY, LAPAROSCOPIC (N/A Abdomen) Diagnosis: (ACUTE CHOLECYSTITIS)    Surgeons: Tisha Palafox M.D. Responsible Provider: Avinash Zepeda M.D.    Anesthesia Type: general ASA Status: 2          Final Anesthesia Type: general    Anesthesia Post Evaluation    Patient location during evaluation: PACU  Patient participation: complete - patient participated  Level of consciousness: awake and alert    Airway patency: patent  Anesthetic complications: no  Cardiovascular status: hemodynamically stable  Respiratory status: acceptable  Hydration status: euvolemic    PONV: none          No complications documented.     Nurse Pain Score: 4 (NPRS)

## 2023-03-06 NOTE — PATIENT INSTRUCTIONS
Current Outpatient Prescriptions Ordered in [facility]   Medication Sig Dispense Refill   • rosuvastatin (CRESTOR) 10 MG Tab Take 1 Tab by mouth every evening. 90 Tab 4   • valacyclovir (VALTREX) 1 GM Tab Take 2 Tabs by mouth 2 times a day. 30 Tab 12   • zolpidem (AMBIEN) 10 MG Tab Take 1 Tab by mouth at bedtime as needed for Sleep. 30 Tab 2   • alprazolam (XANAX) 0.5 MG Tab TAKE ONE TABLET BY MOUTH THREE TIMES DAILY AS NEEDED ANXIETY 30 Tab 2   • disulfiram (ANTABUSE) 250 MG Tab TAKE 1 TABLET BY MOUTH ONCE DAILY 90 Tab 0   • therapeutic multivitamin-minerals (THERAGRAN-M) TABS Take 1 Tab by mouth every day.     • aspirin EC (ECOTRIN) 81 MG TBEC Take 1 Tab by mouth every day. 30 Tab    • ciprofloxacin (CIPRO) 500 MG Tab Take 1 Tab by mouth 2 times a day. 28 Tab 3   • azithromycin (ZITHROMAX) 250 MG Tab Two by mouth on Day One, then one by mouth daily on days 2-5. 12 Tab 3     No current Epic-ordered facility-administered medications on file.       
Controlled with current regime

## (undated) DEVICE — ELECTRODE DUAL RETURN W/ CORD - (50/PK)

## (undated) DEVICE — PROTECTOR ULNA NERVE - (36PR/CA)

## (undated) DEVICE — TUBING CLEARLINK DUO-VENT - C-FLO (48EA/CA)

## (undated) DEVICE — ELECTRODE 850 FOAM ADHESIVE - HYDROGEL RADIOTRNSPRNT (50/PK)

## (undated) DEVICE — CLIP MED LG INTNL HRZN TI ESCP - (20/BX)

## (undated) DEVICE — BAG RETRIEVAL 10ML (10EA/BX)

## (undated) DEVICE — SET LEADWIRE 5 LEAD BEDSIDE DISPOSABLE ECG (1SET OF 5/EA)

## (undated) DEVICE — TROCAR Z THREAD11MM OPTICAL - NON BLADED(6/BX)

## (undated) DEVICE — DERMABOND ADVANCED - (12EA/BX)

## (undated) DEVICE — APPICATOR HEMOSTAT ARISTA XL - FLEXITIP (10EA/CA)

## (undated) DEVICE — LACTATED RINGERS INJ 1000 ML - (14EA/CA 60CA/PF)

## (undated) DEVICE — SCISSORS 5MM CVD (6EA/BX)

## (undated) DEVICE — TROCAR 5X100 NON BLADED Z-TH - READ KII (6/BX)

## (undated) DEVICE — HEMOSTAT ARISTA PWD 5 GRAM - (5/BX)

## (undated) DEVICE — NEPTUNE 4 PORT MANIFOLD - (20/PK)

## (undated) DEVICE — TUBE CONNECT SUCTION CLEAR 120 X 1/4" (50EA/CA)"

## (undated) DEVICE — CANNULA W/SEAL11X100ZTHREAD - (12/BX)

## (undated) DEVICE — DRAPESURG STERI-DRAPE LONG - (10/BX 4BX/CA)

## (undated) DEVICE — SUTURE 4-0 MONOCRYL PLUS PS-2 - 27 INCH (36/BX)

## (undated) DEVICE — CHLORAPREP 26 ML APPLICATOR - ORANGE TINT(25/CA)

## (undated) DEVICE — SET EXTENSION WITH 2 PORTS (48EA/CA) ***PART #2C8610 IS A SUBSTITUTE*****

## (undated) DEVICE — SUTURE GENERAL

## (undated) DEVICE — NEEDLE INSFL 120MM 14GA VRRS - (20/BX)

## (undated) DEVICE — GLOVE BIOGEL INDICATOR SZ 7SURGICAL PF LTX - (50/BX 4BX/CA)

## (undated) DEVICE — SUTURE 0 VICRYL PLUS CT-2 - 27 INCH (36/BX)

## (undated) DEVICE — KIT ANESTHESIA W/CIRCUIT & 3/LT BAG W/FILTER (20EA/CA)

## (undated) DEVICE — SUTURE 0 COATED VICRYL 6-18IN - (12PK/BX)

## (undated) DEVICE — SODIUM CHL IRRIGATION 0.9% 1000ML (12EA/CA)

## (undated) DEVICE — HEAD HOLDER JUNIOR/ADULT

## (undated) DEVICE — CANISTER SUCTION 3000ML MECHANICAL FILTER AUTO SHUTOFF MEDI-VAC NONSTERILE LF DISP  (40EA/CA)

## (undated) DEVICE — SUCTION INSTRUMENT YANKAUER BULBOUS TIP W/O VENT (50EA/CA)

## (undated) DEVICE — GLOVE BIOGEL SZ 6.5 SURGICAL PF LTX (50PR/BX 4BX/CA)

## (undated) DEVICE — SET SUCTION/IRRIGATION WITH DISPOSABLE TIP (6/CA )PART #0250-070-520 IS A SUB

## (undated) DEVICE — SET TUBING PNEUMOCLEAR HIGH FLOW SMOKE EVACUATION (10EA/BX)

## (undated) DEVICE — MASK ANESTHESIA ADULT  - (100/CA)

## (undated) DEVICE — SUTURE 0 VICRYL PLUS UR-6 - 27 INCH (36/BX)

## (undated) DEVICE — TOWEL STOP TIMEOUT SAFETY FLAG (40EA/CA)

## (undated) DEVICE — PACK LAP CHOLE OR - (2EA/CA)

## (undated) DEVICE — SENSOR SPO2 NEO LNCS ADHESIVE (20/BX) SEE USER NOTES

## (undated) DEVICE — CANNULA W/SEAL 5X100 Z-THRE - ADED KII (12/BX)